# Patient Record
Sex: MALE | Race: WHITE | Employment: UNEMPLOYED | URBAN - METROPOLITAN AREA
[De-identification: names, ages, dates, MRNs, and addresses within clinical notes are randomized per-mention and may not be internally consistent; named-entity substitution may affect disease eponyms.]

---

## 2017-07-31 RX ORDER — CLOPIDOGREL BISULFATE 75 MG/1
75 TABLET ORAL DAILY
Qty: 30 TAB | Refills: 6 | Status: SHIPPED | OUTPATIENT
Start: 2017-07-31 | End: 2018-05-07 | Stop reason: SDUPTHER

## 2017-08-04 ENCOUNTER — TELEPHONE (OUTPATIENT)
Dept: CARDIOLOGY CLINIC | Age: 57
End: 2017-08-04

## 2017-08-04 NOTE — TELEPHONE ENCOUNTER
Patient has been having dizziness and \"swish-swish\" sound In the back of his head since yesterday 3 august. Patient is concerned about it and wants to know if it is appropriate to be seen on the  or he needs to be moved earlier. No visual symptoms. He says he hears \"swish\" sounds when he looks to the different directions. Pt went to urgent care and they told him it might be\"vascular\". Patient stated his BP there was 117/73. Asked Dr Alyson Mary about patient's condition. Dr Karena Do stated it is highly unlikely to be vascular and most likely related to inner ear liquid. Stated follow-up on the  is more than appropriate. Called patient. Verified  and full name. Informed about follow up on   per Dr Don Mckeon. Patient verbalized understanding and agreed with the plan of care. Patient plans to either see his PCP or ENT.

## 2017-08-25 ENCOUNTER — OFFICE VISIT (OUTPATIENT)
Dept: CARDIOLOGY CLINIC | Age: 57
End: 2017-08-25

## 2017-08-25 VITALS — HEIGHT: 73 IN

## 2017-08-25 DIAGNOSIS — I25.10 ATHEROSCLEROSIS OF NATIVE CORONARY ARTERY OF NATIVE HEART WITHOUT ANGINA PECTORIS: Primary | ICD-10-CM

## 2017-08-25 RX ORDER — CYCLOBENZAPRINE HCL 10 MG
10 TABLET ORAL
Qty: 90 TAB | Refills: 2 | Status: SHIPPED | OUTPATIENT
Start: 2017-08-25 | End: 2018-07-31 | Stop reason: ALTCHOICE

## 2017-08-25 RX ORDER — ROSUVASTATIN CALCIUM 20 MG/1
TABLET, FILM COATED ORAL
Refills: 3 | COMMUNITY
Start: 2017-06-02 | End: 2018-07-31 | Stop reason: ALTCHOICE

## 2017-08-25 RX ORDER — ALPRAZOLAM 0.5 MG/1
0.5 TABLET ORAL
Qty: 60 TAB | Refills: 2 | Status: SHIPPED | OUTPATIENT
Start: 2017-08-25 | End: 2018-07-31 | Stop reason: ALTCHOICE

## 2017-08-25 NOTE — PROGRESS NOTES
1. Have you been to the ER, urgent care clinic since your last visit? Hospitalized since your last visit? no  2. Have you seen or consulted any other health care providers outside of the [address] [place] [address] since your last visit? Include any pap smears or colon screening.  no

## 2017-08-29 NOTE — PROGRESS NOTES
PATIENT NAME: Mi Looney.         49 y.o.      1960              DATE:2017    REASON FOR VISIT: Coronary artery disease    HISTORY OF PRESENT ILLNESS: Generally feels miserable. This is a chronic problem. Chronically fatigued. Discomfort across both shoulders on a regular basis. These are constant problems and are not related to exertion. Denies symptoms suggestive of angina. Denies dyspnea on exertion, orthopnea, paroxysmal nocturnal dyspnea. Has been taking Xanax long-term for anxiety. His primary care physician has  recently. He has not seen a primary care since that time. He also takes Flexeril for muscle relaxant. PAST MEDICAL HISTORY:   Past Medical History:  No date: Anxiety  No date: Cardiomyopathy [facility])      Comment: EF 50% (GATED NUC ),  40-45%                (VENTRICULOGRAPHY ),  50% (ECHO 2/15),  39%               (GATED NUC 2/15)  No date: Cervical pain  No date: Coronary artery disease      Comment: RCA - 3.5 x 23mm VISION ,  LAD - 3.0 x                18mm XIENCE 3/11  No date: Dyslipidemia  No date: High cholesterol  No date: Noncompliance  No date: Tobacco abuse    PAST SURGICAL HISTORY:   Past Surgical History:  2016: CARDIAC CATHETERIZATION      Comment:    2016: CORONARY ARTERY ANGIOGRAM      Comment:    2016: CORONARY STENT SINGLE W/PTCA      Comment:    2016: LV ANGIOGRAPHY      Comment:    No date: STENT INSERTION      SOCIAL HISTORY:  Social History    Marital status:              Spouse name:                       Years of education:                 Number of children:               Social History Main Topics    Smoking status: Former Smoker                                                                Packs/day: 0.25      Years: 15.00          Types: Cigarettes       Quit date: 2016    Smokeless status: Never Used                        Alcohol use: No              Drug use:  Yes                Special: Marijuana       Comment: rarely      ALLERGIES:    -- Beta Blocker [Beta-Blockers (Beta-Adrenergic Blocking Agts)] -- Other (comments)    --  symptomatic bradycardia   -- Pcn [Penicillins] -- Swelling     CURRENT MEDICATIONS:   Current Outpatient Prescriptions:  CRESTOR 20 mg tablet, TAKE 1 TABLET BY MOUTH AT BEDTIME  ALPRAZolam (XANAX) 0.5 mg tablet, Take 1 Tab by mouth three (3) times daily as needed for Anxiety. Max Daily Amount: 1.5 mg.  cyclobenzaprine (FLEXERIL) 10 mg tablet, Take 1 Tab by mouth three (3) times daily as needed for Muscle Spasm(s). clopidogrel (PLAVIX) 75 mg tab, Take 1 Tab by mouth daily. nitroglycerin (NITROSTAT) 0.4 mg SL tablet, 1 Tab by SubLINGual route every five (5) minutes as needed for Chest Pain. No current facility-administered medications for this visit. REVIEW of SYSTEMS:History obtained from chart review and the patient  General ROS: negative for - weight gain or weight loss  Hematological and Lymphatic ROS: negative for - bleeding problems  Respiratory ROS: no cough, shortness of breath, or wheezing  Cardiovascular ROS: See history of present illness  Musculoskeletal ROS: See history of present illness     PHYSICAL EXAMINATION:   BP (P) 122/88  Pulse (P) 73  Ht 6' 1\" (1.854 m)  Wt (P) 89.4 kg (197 lb)  SpO2 (P) 98%  BMI (P) 25.99 kg/m2  BP Readings from Last 3 Encounters:  08/25/17 : (P) 122/88  07/18/16 : 118/80  06/24/16 : 126/86    Pulse Readings from Last 3 Encounters:  08/25/17 : (P) 73  07/18/16 : 65  06/24/16 : (!) 44    Wt Readings from Last 3 Encounters:  08/25/17 : (P) 89.4 kg (197 lb)  07/18/16 : 90.7 kg (200 lb)  06/23/16 : 88.4 kg (194 lb 14.4 oz)    General: Well-developed white male in no apparent distress. HEENT: Sclera clear. Mucous membranes pink and moist.  Neck: No jugular venous distention. Carotid upstrokes 2+ without bruits. Chest: Clear to  auscultation. Heart: PMI not palpable. Regular rhythm. No murmur or gallop.   Abdomen: Nontender without masses or organomegaly. Extremities: No edema. Skin: Warm and dry. No stasis changes. Neuro: Alert, oriented, speech WNL, no facial asymmetry. Gait WNL. EKG: Inferior myocardial infarction age-indeterminate    IMPRESSION:   Coronary artery disease, no angina status post PTCA  Anxiety  Chronic muscle pains shoulders and back  Hyperlipidemia followed by PCP    PLAN:  No change in cardiac medications. Xanax 0.5 mg p.o. twice daily as needed anxiety. 60 tablets 2 refills  Flexeril 10 mg p.o. 3 times daily as needed 90 tablets 2 refills  Follow-up with primary care physician for further refills    The diagnoses and plan were discussed with patient. All questions answered. Plan of care agreed to by all concerned. Edmonia Apgar.  MD Michel       ,

## 2018-02-23 ENCOUNTER — HOSPITAL ENCOUNTER (OUTPATIENT)
Dept: GENERAL RADIOLOGY | Age: 58
Discharge: HOME | End: 2018-02-23
Payer: COMMERCIAL

## 2018-02-23 DIAGNOSIS — M25.571 RIGHT ANKLE PAIN: ICD-10-CM

## 2018-02-23 DIAGNOSIS — M79.671 RIGHT FOOT PAIN: ICD-10-CM

## 2018-02-23 PROCEDURE — 73630 X-RAY EXAM OF FOOT: CPT

## 2018-02-23 PROCEDURE — 73600 X-RAY EXAM OF ANKLE: CPT

## 2018-03-02 ENCOUNTER — OFFICE VISIT (OUTPATIENT)
Dept: VASCULAR SURGERY | Age: 58
End: 2018-03-02

## 2018-03-02 DIAGNOSIS — M79.604 PAIN IN RIGHT LEG: Primary | ICD-10-CM

## 2018-03-02 NOTE — MR AVS SNAPSHOT
[address] 
[phone] Patient: Johanna Nelson MRN: [id number] Visit Information Date & Time Provider Department Dept. Phone Encounter #  
 3/2/2018 11:00 AM [facility] 1 [facility] [phone] [id number] Upcoming Health Maintenance Date Due Hepatitis C Screening 1960 Pneumococcal 19-64 Medium Risk (1 of 1 - PPSV23) 12/15/1981 DTaP/Tdap/Td series (1 - Tdap) 12/15/1983 FOBT Q 1 YEAR AGE 50-75 12/15/2012 Influenza Age 5 to Adult 8/1/2017 Allergies as of 3/2/2018  Review Complete On: 8/25/2017 By: Ina Osman Severity Noted Reaction Type Reactions Beta Blocker [Beta-blockers (Beta-adrenergic Blocking Agts)]    Other (comments)  
 symptomatic bradycardia Pcn [Penicillins]  03/04/2015   Systemic Swelling Current Immunizations  Never Reviewed No immunizations on file. Not reviewed this visit Vitals Smoking Status Former Smoker Preferred Pharmacy Pharmacy Name Phone [address], [address] [place] [address] [phone] Your Updated Medication List  
  
   
This list is accurate as of 3/2/18 11:30 AM.  Always use your most recent med list.  
  
  
  
  
 ALPRAZolam 0.5 mg tablet Commonly known as:  Jenny Brittomon Take 1 Tab by mouth three (3) times daily as needed for Anxiety. Max Daily Amount: 1.5 mg.  
  
 clopidogrel 75 mg Tab Commonly known as:  PLAVIX Take 1 Tab by mouth daily. CRESTOR 20 mg tablet Generic drug:  rosuvastatin TAKE 1 TABLET BY MOUTH AT BEDTIME  
  
 cyclobenzaprine 10 mg tablet Commonly known as:  FLEXERIL Take 1 Tab by mouth three (3) times daily as needed for Muscle Spasm(s). nitroglycerin 0.4 mg SL tablet Commonly known as:  NITROSTAT 1 Tab by SubLINGual route every five (5) minutes as needed for Chest Pain. Introducing [facility]! [employer] introduces BOOK A TIGER patient portal. Now you can access parts of your medical record, email your doctor's office, and request medication refills online. 1. In your internet browser, go to [email or web address] 2. Click on the First Time User? Click Here link in the Sign In box. You will see the New Member Sign Up page. 3. Enter your BOOK A TIGER Access Code exactly as it appears below. You will not need to use this code after youve completed the sign-up process. If you do not sign up before the expiration date, you must request a new code. · BOOK A TIGER Access Code: [email or web address] Expires: 5/28/2018  9:19 AM 
 
4. Enter the last four digits of your Social Security Number (xxxx) and Date of Birth (mm/dd/yyyy) as indicated and click Submit. You will be taken to the next sign-up page. 5. Create a BOOK A TIGER ID. This will be your BOOK A TIGER login ID and cannot be changed, so think of one that is secure and easy to remember. 6. Create a BOOK A TIGER password. You can change your password at any time. 7. Enter your Password Reset Question and Answer. This can be used at a later time if you forget your password. 8. Enter your e-mail address. You will receive e-mail notification when new information is available in [address] 9. Click Sign Up. You can now view and download portions of your medical record. 10. Click the Download Summary menu link to download a portable copy of your medical information. If you have questions, please visit the Frequently Asked Questions section of the BOOK A TIGER website. Remember, BOOK A TIGER is NOT to be used for urgent needs. For medical emergencies, dial 911. Now available from your iPhone and Android! Please provide this summary of care documentation to your next provider. Your primary care clinician is listed as Lana Villanueva. If you have any questions after today's visit, please call [phone].

## 2018-03-02 NOTE — PROCEDURES
[facility]  *** FINAL REPORT ***    Name: Danni Machado  MRN: [id number]       Outpatient  : 15 Dec 1960  HIS Order #: [id number]  [id number] [facility] Visit #: [id number]  Date: 02 Mar 2018    TYPE OF TEST: Peripheral Venous Testing    REASON FOR TEST  Limb Pain    Right Leg:-  Deep venous thrombosis:           No  Superficial venous thrombosis:    Not examined  Deep venous insufficiency:        Not examined  Superficial venous insufficiency: Not examined      INTERPRETATION/FINDINGS  Duplex images were obtained using 2-D gray scale, color flow and  spectral doppler analysis. Right leg :  1. No evidence of deep vein thrombosis in the right common femoral,  proximal deep femoral, femoral, popliteal, posterior tibial and  peroneal veins. 2. Triphasic right posterior tibial artery flow. 3. Patent contralateral common femoral vein with normal venous  hemodynamics. ADDITIONAL COMMENTS    I have personally reviewed the data relevant to the interpretation of  this  study. TECHNOLOGIST: Vanessa Altamirano RVT, ANDREIMS  Signed: 2018 12:30 PM    PHYSICIAN: Aidan Shankar D.O.   Signed: 2018 05:12 PM

## 2018-05-07 RX ORDER — CLOPIDOGREL BISULFATE 75 MG/1
75 TABLET ORAL DAILY
Qty: 30 TAB | Refills: 6 | Status: SHIPPED | OUTPATIENT
Start: 2018-05-07 | End: 2018-07-31 | Stop reason: ALTCHOICE

## 2018-07-31 ENCOUNTER — OFFICE VISIT (OUTPATIENT)
Dept: CARDIOLOGY CLINIC | Age: 58
End: 2018-07-31

## 2018-07-31 VITALS — WEIGHT: 215 LBS | HEIGHT: 73 IN | BODY MASS INDEX: 28.49 KG/M2 | OXYGEN SATURATION: 97 % | HEART RATE: 69 BPM

## 2018-07-31 DIAGNOSIS — E78.5 DYSLIPIDEMIA: ICD-10-CM

## 2018-07-31 DIAGNOSIS — I20.0 UNSTABLE ANGINA (HCC): ICD-10-CM

## 2018-07-31 DIAGNOSIS — R06.02 SOB (SHORTNESS OF BREATH): ICD-10-CM

## 2018-07-31 DIAGNOSIS — I25.10 ATHEROSCLEROSIS OF NATIVE CORONARY ARTERY OF NATIVE HEART WITHOUT ANGINA PECTORIS: Primary | ICD-10-CM

## 2018-07-31 DIAGNOSIS — R60.9 SWELLING: ICD-10-CM

## 2018-07-31 RX ORDER — ASPIRIN 81 MG/1
81 TABLET ORAL DAILY
Qty: 30 TAB | Refills: 6 | Status: SHIPPED | OUTPATIENT
Start: 2018-07-31 | End: 2018-12-28 | Stop reason: ALTCHOICE

## 2018-07-31 RX ORDER — ASPIRIN 81 MG/1
81 TABLET ORAL DAILY
COMMUNITY
End: 2018-12-28 | Stop reason: ALTCHOICE

## 2018-07-31 RX ORDER — ROSUVASTATIN CALCIUM 40 MG/1
40 TABLET, COATED ORAL
Qty: 30 TAB | Refills: 6 | Status: SHIPPED | OUTPATIENT
Start: 2018-07-31 | End: 2018-12-28 | Stop reason: ALTCHOICE

## 2018-07-31 NOTE — PROGRESS NOTES
HISTORY OF PRESENT ILLNESS Lien Menedz is a 56 y.o. male. ASSESSMENT and PLAN Mr. Valorie Skinner has history of CAD. Back in 2011, he had Vision stent to the RCA and Xience stent to the LAD at [facility]. He presented with unstable angina to [facility] in June 2016. He had new distal 90% RCA stenosis proximal to the bifurcation. This was successfully stented using science BRANDI. He could not tolerate beta blockers in the past and previously had refused statins. However, because of significant dyslipidemia, he is now on Crestor 20 mg daily. His cholesterol LDL still has not met target. Therefore, this will be increased to 40 mg daily. He was told by his sister who is a nurse that Plavix can cause swelling and should only be continued for 1 year after BRANDI. Therefore, he discontinued this on his own. He feels that his swelling has improved. All questions were answered. Over 30 minutes spent. He will continue on Crestor 40 mg daily and baby aspirin daily. As noted above, he could not tolerate beta blockers in the past due to significant fatigue and bradycardia. His blood pressure is upper normal to mildly elevated. Again, lengthy discussion was carried on about the importance of weight control. Today he weighs 215 pounds. His good target weight will be 200-205 pounds. He has successfully stayed off of active tobacco use. He uses Vape. I will see him back in 6-9 months. Thank you. Encounter Diagnoses Name Primary?  Atherosclerosis of native coronary artery of native heart without angina pectoris Yes  Unstable angina (HCC)  Dyslipidemia  SOB (shortness of breath)  Swelling   
 
the following changes in treatment are made: See above 
lab results and schedule of future lab studies reviewed with patient 
reviewed diet, exercise and weight control 
very strongly urged to quit smoking to reduce cardiovascular risk 
cardiovascular risk and specific lipid/LDL goals reviewed 
use of aspirin to prevent MI and TIA's discussed HPI Mr. Xuan Barone comes in as a new patient to me. He denies any episodes of chest pains. He denies any exertional chest pains. He has noted episodes of ankle swelling and headaches. He self discontinued Plavix and his ankle swelling has resolved. He has difficulty sleeping at night due to insomnia and often experiences headaches. He denies any orthopnea or PND. He denies any exertional chest pains. Review of Systems Constitutional: Positive for malaise/fatigue. Respiratory: Negative for shortness of breath. Cardiovascular: Positive for leg swelling. Negative for chest pain, palpitations, orthopnea, claudication and PND. Neurological: Positive for headaches. All other systems reviewed and are negative. Physical Exam  
Constitutional: He is oriented to person, place, and time. He appears well-developed and well-nourished. HENT:  
Head: Normocephalic. Eyes: Conjunctivae are normal.  
Neck: Neck supple. No JVD present. Carotid bruit is not present. No thyromegaly present. Cardiovascular: Normal rate and regular rhythm. No murmur heard. Pulmonary/Chest: Breath sounds normal.  
Abdominal: Bowel sounds are normal.  
Musculoskeletal: He exhibits no edema. Neurological: He is alert and oriented to person, place, and time. Skin: Skin is warm and dry. Nursing note and vitals reviewed. PCP: GRAHAM Jaimes Past Medical History:  
Diagnosis Date  Anxiety  Cardiomyopathy ([facility] [address] EF 50% (GATED NUC 7/12),  40-45% (VENTRICULOGRAPHY 5/13),  50% (ECHO 2/15),  39% (GATED NUC 2/15)  Cervical pain  Coronary artery disease RCA - 3.5 x 23mm VISION 2/11,  LAD - 3.0 x 18mm XIENCE 3/11  Dyslipidemia  High cholesterol  Noncompliance  Tobacco abuse Past Surgical History:  
Procedure Laterality Date  CARDIAC CATHETERIZATION  6/23/2016  CORONARY ARTERY ANGIOGRAM  6/23/2016  CORONARY STENT SINGLE W/PTCA  6/23/2016  LV ANGIOGRAPHY  6/23/2016  STENT INSERTION Current Outpatient Prescriptions Medication Sig Dispense Refill  aspirin delayed-release 81 mg tablet Take 81 mg by mouth daily.  rosuvastatin (CRESTOR) 40 mg tablet Take 1 Tab by mouth nightly. 30 Tab 6  
 aspirin delayed-release 81 mg tablet Take 1 Tab by mouth daily. 30 Tab 6  
 nitroglycerin (NITROSTAT) 0.4 mg SL tablet 1 Tab by SubLINGual route every five (5) minutes as needed for Chest Pain. 25 Tab 1 The patient has a family history of 
 
Social History Substance Use Topics  Smoking status: Former Smoker Packs/day: 0.25 Years: 15.00 Types: Cigarettes Quit date: 6/24/2016  Smokeless tobacco: Never Used  Alcohol use No  
 
 
Lab Results Component Value Date/Time Cholesterol, total 204 (H) 06/22/2016 01:10 AM  
 HDL Cholesterol 45 06/22/2016 01:10 AM  
 LDL, calculated 134.2 (H) 06/22/2016 01:10 AM  
 Triglyceride 124 06/22/2016 01:10 AM  
 CHOL/HDL Ratio 4.5 06/22/2016 01:10 AM  
  
 
BP Readings from Last 3 Encounters:  
08/25/17 (P) 122/88  
07/18/16 118/80  
06/24/16 126/86 Pulse Readings from Last 3 Encounters:  
07/31/18 69  
08/25/17 (P) 73  
07/18/16 65 Wt Readings from Last 3 Encounters:  
07/31/18 97.5 kg (215 lb)  
08/25/17 (P) 89.4 kg (197 lb)  
07/18/16 90.7 kg (200 lb) EKG: unchanged from previous tracings, normal sinus rhythm, nonspecific ST and T waves changes, LAFB, poor R-wave progression in the precordial leads.

## 2018-07-31 NOTE — PATIENT INSTRUCTIONS
ECHO for sob swelling Start Crestor 40mg once daily Aspirin 81mg once daily Lipid panel (blood Work) check 6 weeks Follow up 9 months

## 2018-08-07 ENCOUNTER — HOSPITAL ENCOUNTER (OUTPATIENT)
Dept: NON INVASIVE DIAGNOSTICS | Age: 58
Discharge: HOME | End: 2018-08-07
Attending: INTERNAL MEDICINE
Payer: COMMERCIAL

## 2018-08-07 VITALS
SYSTOLIC BLOOD PRESSURE: 122 MMHG | HEIGHT: 73 IN | DIASTOLIC BLOOD PRESSURE: 88 MMHG | WEIGHT: 215 LBS | BODY MASS INDEX: 28.49 KG/M2

## 2018-08-07 DIAGNOSIS — R06.02 SOB (SHORTNESS OF BREATH): ICD-10-CM

## 2018-08-07 DIAGNOSIS — R60.9 SWELLING: ICD-10-CM

## 2018-08-07 LAB
ECHO AO ROOT DIAM: 3.76 CM
ECHO LA AREA 4C: 21.9 CM2
ECHO LA VOL 2C: 88.63 ML (ref 18–58)
ECHO LA VOL 4C: 70.26 ML (ref 18–58)
ECHO LA VOL BP: 87.74 ML (ref 18–58)
ECHO LA VOL/BSA BIPLANE: 39.54 ML/M2
ECHO LA VOLUME INDEX A2C: 39.94 ML/M2
ECHO LA VOLUME INDEX A4C: 31.66 ML/M2
ECHO LV E' LATERAL VELOCITY: 8.92 CM/S
ECHO LV E' SEPTAL VELOCITY: 7.25 CM/S
ECHO LV INTERNAL DIMENSION DIASTOLIC: 6.64 CM (ref 4.2–5.9)
ECHO LV INTERNAL DIMENSION SYSTOLIC: 4.6 CM
ECHO LV IVSD: 1.19 CM (ref 0.6–1)
ECHO LV MASS 2D: 456.9 G (ref 88–224)
ECHO LV MASS INDEX 2D: 205.9 G/M2
ECHO LV POSTERIOR WALL DIASTOLIC: 1.23 CM (ref 0.6–1)
ECHO LVOT DIAM: 2.3 CM
ECHO LVOT PEAK GRADIENT: 3.4 MMHG
ECHO LVOT PEAK VELOCITY: 91.64 CM/S
ECHO LVOT VTI: 18.71 CM
ECHO MV A VELOCITY: 71.03 CM/S
ECHO MV AREA PISA: 0.1 CM2
ECHO MV E DECELERATION TIME (DT): 176.4 MS
ECHO MV E VELOCITY: 0.74 CM/S
ECHO MV E/A RATIO: 0.01
ECHO MV E/E' LATERAL: 0.08
ECHO MV E/E' RATIO (AVERAGED): 0.09
ECHO MV E/E' SEPTAL: 0.1
ECHO MV EROA PISA: 0.1 CM2
ECHO MV REGURGITANT PEAK GRADIENT: 172.1 MMHG
ECHO MV REGURGITANT PEAK VELOCITY: 655.85 CM/S
ECHO MV REGURGITANT RADIUS PISA: 0.42 CM
ECHO MV REGURGITANT VOLUME: 11.97 CC
ECHO MV REGURGITANT VTIA: 229.57 CM
ECHO RV TAPSE: 2.45 CM (ref 1.5–2)
ECHO TV REGURGITANT MAX VELOCITY: 291.97 CM/S
ECHO TV REGURGITANT PEAK GRADIENT: 34.1 MMHG

## 2018-08-07 PROCEDURE — 93306 TTE W/DOPPLER COMPLETE: CPT

## 2018-12-07 ENCOUNTER — TELEPHONE (OUTPATIENT)
Dept: CARDIOLOGY CLINIC | Age: 58
End: 2018-12-07

## 2018-12-07 NOTE — TELEPHONE ENCOUNTER
----- Message from [id number] Constantine Sutton MD sent at 9/11/2018  4:19 PM EDT ----- His EF has declined from 50% to 40% from 2016. There is new mention of mild to moderate aortic insufficiency. Ask him to come and see Cinthia Garcia. If shortness of breath is worsening, will likely need consideration for left and right heart catheterization. He is a new patient to me and I cannot give definitive recommendations just on repeat echo read by Dr. Brian Cortes. 
----- Message ----- From: Bro Lu RN Sent: 8/7/2018  12:28 PM 
     To: [id number] Constantine Sutton MD 
 
Ordered due to SOB

## 2018-12-19 ENCOUNTER — TELEPHONE (OUTPATIENT)
Dept: CARDIOLOGY CLINIC | Age: 58
End: 2018-12-19

## 2018-12-19 NOTE — TELEPHONE ENCOUNTER
----- Message from [id number] Constantine Sutton MD sent at 9/11/2018  4:19 PM EDT -----  His EF has declined from 50% to 40% from 2016. There is new mention of mild to moderate aortic insufficiency. Ask him to come and see Jenelle Ramires. If shortness of breath is worsening, will likely need consideration for left and right heart catheterization.   He is a new patient to me and I cannot give definitive recommendations just on repeat echo read by Dr. Lauren Renteria.  ----- Message -----     From: Saud Brady RN     Sent: 8/7/2018  12:28 PM       To: [id number] Constantine Sutton MD    Ordered due to SOB

## 2018-12-26 NOTE — PROGRESS NOTES
Cris Barajas. presents today for follow-up. He was last seen by Dr Rin Sosa on 7/31/18. He reports elevated blood pressures and a persistent occipital headache that has been occurring for about 10 days. He has been taking about 2400mg of ibuprofen and he receives minimal relief. He states that he saw the NP at his PCP's office and she recommended that he have an eye exam done which he did. He was supposed to follow-up with her after the eye exam but he has not scheduled an appointment. While reviewing his medications, he states that his ASA was discontinued some time ago as he develops lower extremity edema when he takes aspirin. He is a 66year old male with history of CAD (s/p stent to the RCA in Feb. 2011 and stent to the LAD in March 2011). He also has history of dyslipidemia and some non-compliance in the past with taking his medications mainly due to financial constraints. He has not wanted to take a beta blocker because he states that he had an episode of \"low heart rate\" in the past.  He presented to the hospital on 6/21/16, with complaints of exertional chest pain that began 4 to 5 days prior to presenting to the hospital.  He states that symptoms began while he was mowing grass and had to stop to rest several times so the pain would go away. Initial EKG showed no acute changes and initial troponin was within normal limits. He underwent cardiac catheterization on 6/23/16 which showed a new distal 90% immediately proximal to the bifurcation for which he underwent stenting with a 2.75 mm Xience BRANDI (12 mm length) stent. He was started on Plavix. He had an echo done in August 2018, and it showed and EF of 40%, mild concentric LVH, grade 2 diastolic dysfunction, and PASP of 37 mmHg. He denies chest pain, tightness, heaviness, and palpitations. He denies shortness of breath at rest, dyspnea on exertion, orthopnea and PND. He denies abdominal bloating.   He denies lightheadedness, dizziness, and syncope. He admits to lower extremity edema and denies claudication. Denies nausea, vomiting, diarrhea, fever, chills. He admits to being \"hyperactive\" and is not able to sit for prolonged periods of time. He states that his Xanax was discontinued some time ago and he has been \"bouncing around since that time. \"            PMH:  Past Medical History:   Diagnosis Date    Anxiety     Cardiomyopathy ([address]     EF 50% (GATED NUC 7/12),  40-45% (VENTRICULOGRAPHY 5/13),  50% (ECHO 2/15),  39% (GATED NUC 2/15)    Cervical pain     Coronary artery disease     RCA - 3.5 x 23mm VISION 2/11,  LAD - 3.0 x 18mm XIENCE 3/11    Dyslipidemia     High cholesterol     Noncompliance     Tobacco abuse        PSH:  Past Surgical History:   Procedure Laterality Date    CARDIAC CATHETERIZATION  6/23/2016         CORONARY ARTERY ANGIOGRAM  6/23/2016         CORONARY STENT SINGLE W/PTCA  6/23/2016         LV ANGIOGRAPHY  6/23/2016         STENT INSERTION         MEDS:  Current Outpatient Medications   Medication Sig    rosuvastatin (CRESTOR) 10 mg tablet Take 10 mg by mouth nightly.  nitroglycerin (NITROSTAT) 0.4 mg SL tablet 1 Tab by SubLINGual route every five (5) minutes as needed for Chest Pain. No current facility-administered medications for this visit. Allergies and Sensitivities:  Allergies   Allergen Reactions    Beta Blocker [Beta-Blockers (Beta-Adrenergic Blocking Agts)] Other (comments)     symptomatic bradycardia    Pcn [Penicillins] Swelling       Family History:  Family History   Problem Relation Age of Onset    Hypertension Mother     Diabetes Mother     Colon Cancer Mother 79    Heart Disease Father        Social History:  He  reports that he quit smoking about 2 years ago. His smoking use included cigarettes. He has a 3.75 pack-year smoking history. he has never used smokeless tobacco.  He  reports that he does not drink alcohol.       Physical:  Visit Vitals  BP (!) 140/96   Pulse 70   Ht 6' 1\" (1.854 m)   Wt 98 kg (216 lb)   SpO2 98%   BMI 28.50 kg/m²     His weight is up 1 pound since his last appointment. Exam:  Neck:  Supple, no JVD, no carotid bruits  CV:  Normal S1 and  S2, no murmurs, rubs, or gallops noted  Lungs:  Clear to ausculation throughout, no wheezes or rales  Abd:  Soft, non-tender, non-distended with good bowel sounds. No hepatosplenomegaly  Extremities:  1+ softly pitting lower extremity edema around his ankles. Data:  EKG:    Read by Glen Pool MD - Sinus rhythm with PVCs.  IVCD.  Diffuse ST segment and T-wave abnormality      LABS:  Lab Results   Component Value Date/Time    Sodium 139 06/23/2016 12:53 PM    Potassium 4.1 06/23/2016 12:53 PM    Chloride 107 06/23/2016 12:53 PM    CO2 28 06/23/2016 12:53 PM    Glucose 159 (H) 06/23/2016 12:53 PM    BUN 25 (H) 06/23/2016 12:53 PM    Creatinine 1.09 06/23/2016 12:53 PM     Lab Results   Component Value Date/Time    Cholesterol, total 204 (H) 06/22/2016 01:10 AM    HDL Cholesterol 45 06/22/2016 01:10 AM    LDL, calculated 134.2 (H) 06/22/2016 01:10 AM    Triglyceride 124 06/22/2016 01:10 AM    CHOL/HDL Ratio 4.5 06/22/2016 01:10 AM     Lab Results   Component Value Date/Time    ALT (SGPT) 28 06/21/2016 06:41 PM         Impression / Plan:  1.  CAD, s/p recent PCI/stent to RCA (6/23/16) and history of PCI/stents to RCA in Feb. 2011 and the LAD in March 2011  2. Dyslipidemia, currently not on a statin due to cost    Mr. Jackelin Lancaster was seen today for follow-up. He reports elevated blood pressures at home and also a persistent occipital headache that has been occurring for about 12 days. He has been taking about 2400mg of ibuprofen in a day with minimal relief. He states that primary care is aware of the headache and an eye exam was recommended which he had performed. He was supposed to follow-up with the NP at his PCP's office after the eye exam but he did not schedule an appointment.   He will call for follow-up on 12/31/18. If headache worsens or does not improve prior to follow-up with PCP, he was instructed to go to the ER for further evaluation and treatment. His blood pressure today is elevated at 140/96 today. He admits to dietary indiscretion with sodium as he does not limit his sodium intake and eats out almost every day. He also admits to drinking at least 5, 16 oz bottles of water and 24 ounces of Pepsi daily. He also continues to smoke about 10 cigarettes per day (1/2 pack). We had a long discussion regarding the need for him to adhere to a low sodium diet (2000mg per day) and to decrease his fluid intake. He is resistant as he states that he does not know what to eat and everything he likes is normally high in sodium. Patient education material re:  HTN, low sodium diet, and DASH diet attached to his after visit summary. Informed that his dietary intake of high sodium foods and fluids is likely contributing to his elevated blood pressures and edema. Greater than 50% of a 40 minute visit was spent in discussion, counseling, and answering questions. Smoking cessation was discussed and highly encouraged. He had an echo done in Aug. 2018 and his EF was noted to be down to 40%. Medical management of cardiomyopathy was discussed. Will start him on carvedilol 3.125mg BID and will attempt to titrate his dose every 3-4 weeks. At some point, will also try to add an ACEI and aldactone (if his blood pressure allows). He is hesitant to return any sooner as he states that he is currently not working and his co-pay is $50 per visit. He will follow-up with Dr. Omer Villanueva as scheduled and as needed. Tiny Valdivia MSN, FNP-BC    Please note:  Portions of this chart were created with Dragon medical speech to text program.  Unrecognized errors may be present.

## 2018-12-28 ENCOUNTER — OFFICE VISIT (OUTPATIENT)
Dept: CARDIOLOGY CLINIC | Age: 58
End: 2018-12-28

## 2018-12-28 VITALS
OXYGEN SATURATION: 98 % | DIASTOLIC BLOOD PRESSURE: 96 MMHG | HEART RATE: 70 BPM | HEIGHT: 73 IN | WEIGHT: 216 LBS | SYSTOLIC BLOOD PRESSURE: 140 MMHG | BODY MASS INDEX: 28.63 KG/M2

## 2018-12-28 DIAGNOSIS — I25.10 ATHEROSCLEROSIS OF NATIVE CORONARY ARTERY OF NATIVE HEART WITHOUT ANGINA PECTORIS: Primary | ICD-10-CM

## 2018-12-28 DIAGNOSIS — I42.9 CARDIOMYOPATHY, UNSPECIFIED TYPE (HCC): ICD-10-CM

## 2018-12-28 DIAGNOSIS — E78.5 DYSLIPIDEMIA: ICD-10-CM

## 2018-12-28 RX ORDER — ROSUVASTATIN CALCIUM 10 MG/1
10 TABLET, COATED ORAL
COMMUNITY
End: 2019-08-12 | Stop reason: SDUPTHER

## 2018-12-28 NOTE — PROGRESS NOTES
Makayla Bryant. presents today for Chief Complaint Patient presents with  Follow-up 6 month Makayla Bryant. preferred language for health care discussion is english/other. Is someone accompanying this pt? Yes Is the patient using any DME equipment during OV? No 
 
Depression Screening: No flowsheet data found. Learning Assessment: 
Learning Assessment 2/12/2015 PRIMARY LEARNER Patient PRIMARY LANGUAGE ENGLISH  
LEARNER PREFERENCE PRIMARY DEMONSTRATION  
ANSWERED BY patient RELATIONSHIP SELF Abuse Screening: No flowsheet data found. Fall Risk No flowsheet data found. Pt currently taking Antiplatelet therapy? No 
 
Coordination of Care: 1. Have you been to the ER, urgent care clinic since your last visit? Hospitalized since your last visit? No 
2. Have you seen or consulted any other health care providers outside of the [address] [place] [address] since your last visit? Include any pap smears or colon screening.  No

## 2019-01-16 NOTE — PROGRESS NOTES
Margie Duvall presents today for follow-up after being started on carvedilol 3.125mg BID. During his last visit, he reported elevated blood pressures and a persistent occipital headache that had been occurring for about 10 days. He reports that he began taking the carvedilol and did not feel it was working after one week so he increased the dose on his own to 6.25mg BID. He noticed improvement in his blood pressure and heart rate. He reports that his heart rate was elevated in the 120's at the initiation of the therapy. With the increased dose, his heart rate went into the 90's. He also reports that he has taken some aspirin since his last visit and he has not had any problems with edema. He also has been trying to limit his sodium and not drink as much fluids. **Note:  He is listed to have an allergy to beta blockers (symptomatic bradycardia) but he states that the carvedilol has not affected him as other beta blockers used in the past.** While reviewing his medications, he states that his ASA was discontinued some time ago as he develops lower extremity edema when he takes aspirin. He is a 66year old male with history of CAD (s/p stent to the RCA in Feb. 2011 and stent to the LAD in March 2011). He also has history of dyslipidemia and some non-compliance in the past with taking his medications mainly due to financial constraints. He has not wanted to take a beta blocker because he states that he had an episode of \"low heart rate\" in the past.  He presented to the hospital on 6/21/16, with complaints of exertional chest pain that began 4 to 5 days prior to presenting to the hospital.  He states that symptoms began while he was mowing grass and had to stop to rest several times so the pain would go away. Initial EKG showed no acute changes and initial troponin was within normal limits.   He underwent cardiac catheterization on 6/23/16 which showed a new distal 90% immediately proximal to the bifurcation for which he underwent stenting with a 2.75 mm Xience BRANDI (12 mm length) stent. He was started on Plavix. He had an echo done in August 2018, and it showed and EF of 40%, mild concentric LVH, grade 2 diastolic dysfunction, and PASP of 37 mmHg. He was last seen by Dr Dalia Bautista on 7/31/18. Today, he reports a \"soreness\" in the back of his neck somewhat reminiscent of what he experienced prior to stenting in 2011. He denies chest pain, tightness, heaviness, and admits to palpitations and chest \"discomfort. \"  He denies shortness of breath at rest, dyspnea on exertion, orthopnea and PND. He denies abdominal bloating. He denies lightheadedness, dizziness, and syncope. He admits to lower extremity edema and denies claudication. Denies nausea, vomiting, diarrhea, fever, chills. He admits to being \"hyperactive\" and is not able to sit for prolonged periods of time. He states that his Xanax was discontinued some time ago and he has been \"bouncing around since that time. \"   
 
 
 
 
PMH: 
Past Medical History:  
Diagnosis Date  Anxiety  Cardiomyopathy ([facility] [address] EF 50% (GATED NUC 7/12),  40-45% (VENTRICULOGRAPHY 5/13),  50% (ECHO 2/15),  39% (GATED NUC 2/15)  Cervical pain  Coronary artery disease RCA - 3.5 x 23mm VISION 2/11,  LAD - 3.0 x 18mm XIENCE 3/11  Dyslipidemia  High cholesterol  Noncompliance  Tobacco abuse PSH: 
Past Surgical History:  
Procedure Laterality Date  CARDIAC CATHETERIZATION  6/23/2016  CORONARY ARTERY ANGIOGRAM  6/23/2016  CORONARY STENT SINGLE W/PTCA  6/23/2016  LV ANGIOGRAPHY  6/23/2016  STENT INSERTION    
 
 
MEDS: 
Current Outpatient Medications Medication Sig  carvedilol (COREG) 3.125 mg tablet Take 2 Tabs by mouth two (2) times daily (with meals).  rosuvastatin (CRESTOR) 10 mg tablet Take 10 mg by mouth nightly.  nitroglycerin (NITROSTAT) 0.4 mg SL tablet 1 Tab by SubLINGual route every five (5) minutes as needed for Chest Pain. No current facility-administered medications for this visit. Allergies and Sensitivities: 
Allergies Allergen Reactions  Beta Blocker [Beta-Blockers (Beta-Adrenergic Blocking Agts)] Other (comments)  
  symptomatic bradycardia  Pcn [Penicillins] Swelling Family History: 
Family History Problem Relation Age of Onset  Hypertension Mother  Diabetes Mother  Colon Cancer Mother 79  
 Heart Disease Father Social History: He  reports that he quit smoking about 2 years ago. His smoking use included cigarettes. He has a 3.75 pack-year smoking history. he has never used smokeless tobacco.  He  reports that he does not drink alcohol. Physical: 
Visit Vitals BP (!) 150/96 Pulse 60 Ht 6' 1\" (1.854 m) Wt 96.2 kg (212 lb) SpO2 98% BMI 27.97 kg/m² His weight is down 4 pounds since his last appointment. Exam: 
Neck:  Supple, no JVD, no carotid bruits CV:  Normal S1 and  S2, no murmurs, rubs, or gallops noted Lungs:  Clear to ausculation throughout, no wheezes or rales Abd:  Soft, non-tender, non-distended with good bowel sounds. No hepatosplenomegaly Extremities:  No lower extremity edema Data: 
EKG:    Sinus rhythm, occasional ectopic ventricular beat.  Inferior infarct, age undetermined LABS: 
Lab Results Component Value Date/Time Sodium 139 06/23/2016 12:53 PM  
 Potassium 4.1 06/23/2016 12:53 PM  
 Chloride 107 06/23/2016 12:53 PM  
 CO2 28 06/23/2016 12:53 PM  
 Glucose 159 (H) 06/23/2016 12:53 PM  
 BUN 25 (H) 06/23/2016 12:53 PM  
 Creatinine 1.09 06/23/2016 12:53 PM  
 
Lab Results Component Value Date/Time  Cholesterol, total 204 (H) 06/22/2016 01:10 AM  
 HDL Cholesterol 45 06/22/2016 01:10 AM  
 LDL, calculated 134.2 (H) 06/22/2016 01:10 AM  
 Triglyceride 124 06/22/2016 01:10 AM  
 CHOL/HDL Ratio 4.5 06/22/2016 01:10 AM  
 
Lab Results Component Value Date/Time ALT (SGPT) 28 06/21/2016 06:41 PM  
 
 
 
Impression / Plan: 1.  CAD, s/p recent PCI/stent to RCA (6/23/16) and history of PCI/stents to RCA in Feb. 2011 and the LAD in March 2011 2. Dyslipidemia, currently not on a statin due to cost 
3. Chest pain 4. Palpitations Mr. Christina Arriola was seen today for follow-up after being started on carvedilol 3.125mg BID to assist with blood pressure control. His blood pressure during his last visit was 140/96 and he reported high blood pressures at home. He complained of a headache that had been occurring for several days. He was supposed to follow-up with the NP at his PCP's office but did not do so after an eye exam so I recommended that he schedule an appointment as soon as possible. He was advised that it the headache worsened or he was unable to go to the PCP's office, he needs to go to the ER for further evaluation. He reports that he is tolerating the carvedilol and admits that after taking it one week and not noticing a marked improvement in his blood pressure, he began taking 2 of the 3.125mg tablets twice a day. He also reports that his heart rate at times was in the 120's when the carvedilol was initiated. After he began taking the increased dose of carvedilol, his blood pressure and heart rate improved. His blood pressure remains elevated but improved. An EKG done today shows sinus rhythm with a PVC with heart rate in the 70's. He was instructed to continue carvedilol 6.25mg BID (new script given) and will be started on lisinopril 10mg once a day. I did ask that he not make dose adjustments on his own. He told me that he hates \"needles\" and was made aware that a BMP will be requested during his next visit.    
As for his complaints of occasional chest discomfort and \"soreness\" in the back of his neck (reminiscent of symptoms he experienced prior to his stents in 2011), will ask that he have a pharmacologic nuclear stress test to assess for progression of disease. Smoking cessation was discussed and highly encouraged. He is smoking less and recently, 1 pack will last for 3 days instead of 1 pack per day. He had an echo done in Aug. 2018 and his EF was noted to be down to 40%. Medical management of cardiomyopathy was discussed. At some point, will also try to add an ACEI and aldactone (if his blood pressure allows). He is hesitant to return any sooner as he states that he is currently not working and his co-pay is $50 per visit. He will follow-up with Dr. Humphrey Clifton as scheduled and as needed. Miguel A Done MSN, FNP-BC Please note:  Portions of this chart were created with Dragon medical speech to text program.  Unrecognized errors may be present.

## 2019-01-18 ENCOUNTER — OFFICE VISIT (OUTPATIENT)
Dept: CARDIOLOGY CLINIC | Age: 59
End: 2019-01-18

## 2019-01-18 VITALS
WEIGHT: 212 LBS | SYSTOLIC BLOOD PRESSURE: 150 MMHG | HEART RATE: 60 BPM | DIASTOLIC BLOOD PRESSURE: 96 MMHG | BODY MASS INDEX: 28.1 KG/M2 | OXYGEN SATURATION: 98 % | HEIGHT: 73 IN

## 2019-01-18 DIAGNOSIS — E78.5 DYSLIPIDEMIA: ICD-10-CM

## 2019-01-18 DIAGNOSIS — I10 ESSENTIAL HYPERTENSION: Primary | ICD-10-CM

## 2019-01-18 DIAGNOSIS — I42.9 CARDIOMYOPATHY, UNSPECIFIED TYPE (HCC): ICD-10-CM

## 2019-01-18 DIAGNOSIS — I25.10 ATHEROSCLEROSIS OF NATIVE CORONARY ARTERY OF NATIVE HEART WITHOUT ANGINA PECTORIS: ICD-10-CM

## 2019-01-18 DIAGNOSIS — R07.9 CHEST PAIN, UNSPECIFIED TYPE: ICD-10-CM

## 2019-01-18 RX ORDER — CARVEDILOL 3.12 MG/1
6.25 TABLET ORAL 2 TIMES DAILY WITH MEALS
Qty: 1 TAB | Refills: 0
Start: 2019-01-18 | End: 2019-01-18 | Stop reason: DRUGHIGH

## 2019-01-18 RX ORDER — LISINOPRIL 10 MG/1
10 TABLET ORAL DAILY
Qty: 30 TAB | Refills: 6 | Status: SHIPPED | OUTPATIENT
Start: 2019-01-18 | End: 2019-08-12 | Stop reason: SDUPTHER

## 2019-01-18 RX ORDER — CARVEDILOL 6.25 MG/1
6.25 TABLET ORAL 2 TIMES DAILY WITH MEALS
Qty: 60 TAB | Refills: 6 | Status: SHIPPED | OUTPATIENT
Start: 2019-01-18 | End: 2019-08-12 | Stop reason: SDUPTHER

## 2019-01-18 RX ORDER — CARVEDILOL 3.12 MG/1
TABLET ORAL 2 TIMES DAILY WITH MEALS
COMMUNITY
End: 2019-01-18

## 2019-01-18 NOTE — PATIENT INSTRUCTIONS
Continue carvedilol 6.25mg twice a day Begin lisinopril 10mg once a day Follow-up with Chelsie Joaquin in 3 weeks Pharmacologic nuclear stress test; Dx:  CAD, chest pain

## 2019-01-18 NOTE — PROGRESS NOTES
[facility] for Chief Complaint Patient presents with  Follow-up 3 weeks after starting Coreg [facility]. preferred language for health care discussion is english/other. Is someone accompanying this pt? No 
 
Is the patient using any DME equipment during OV? No 
 
Depression Screening: No flowsheet data found. Learning Assessment: 
Learning Assessment 2/12/2015 PRIMARY LEARNER Patient PRIMARY LANGUAGE ENGLISH  
LEARNER PREFERENCE PRIMARY DEMONSTRATION  
ANSWERED BY patient RELATIONSHIP SELF Abuse Screening: No flowsheet data found. Fall Risk No flowsheet data found. Pt currently taking Antiplatelet therapy? No 
 
Coordination of Care: 1. Have you been to the ER, urgent care clinic since your last visit? Hospitalized since your last visit? No 
 
2. Have you seen or consulted any other health care providers outside of the [address] [place] [address] since your last visit? Include any pap smears or colon screening.  No

## 2019-01-29 ENCOUNTER — HOSPITAL ENCOUNTER (OUTPATIENT)
Dept: NON INVASIVE DIAGNOSTICS | Age: 59
Discharge: HOME | End: 2019-01-29
Attending: NURSE PRACTITIONER
Payer: COMMERCIAL

## 2019-01-29 VITALS
WEIGHT: 212 LBS | HEIGHT: 73 IN | SYSTOLIC BLOOD PRESSURE: 132 MMHG | DIASTOLIC BLOOD PRESSURE: 88 MMHG | BODY MASS INDEX: 28.1 KG/M2

## 2019-01-29 DIAGNOSIS — I25.10 ATHEROSCLEROSIS OF NATIVE CORONARY ARTERY OF NATIVE HEART WITHOUT ANGINA PECTORIS: ICD-10-CM

## 2019-01-29 DIAGNOSIS — R07.9 CHEST PAIN, UNSPECIFIED TYPE: ICD-10-CM

## 2019-01-29 LAB
STRESS BASELINE DIAS BP: 88 MMHG
STRESS BASELINE HR: 71 BPM
STRESS BASELINE SYS BP: 132 MMHG
STRESS ESTIMATED WORKLOAD: 1.6 METS
STRESS EXERCISE DUR MIN: NORMAL
STRESS PEAK DIAS BP: 86 MMHG
STRESS PEAK SYS BP: 134 MMHG
STRESS PERCENT HR ACHIEVED: 74 %
STRESS POST PEAK HR: 103 BPM
STRESS RATE PRESSURE PRODUCT: NORMAL BPM*MMHG
STRESS TARGET HR: 139 BPM

## 2019-01-29 PROCEDURE — 74011250636 HC RX REV CODE- 250/636: Performed by: NURSE PRACTITIONER

## 2019-01-29 PROCEDURE — 93017 CV STRESS TEST TRACING ONLY: CPT

## 2019-01-29 RX ORDER — SODIUM CHLORIDE 9 MG/ML
250 INJECTION, SOLUTION INTRAVENOUS CONTINUOUS
Status: DISCONTINUED | OUTPATIENT
Start: 2019-01-29 | End: 2019-02-02 | Stop reason: HOSPADM

## 2019-01-29 RX ADMIN — REGADENOSON 0.4 MG: 0.08 INJECTION, SOLUTION INTRAVENOUS at 10:50

## 2019-01-29 RX ADMIN — SODIUM CHLORIDE 250 ML: 900 INJECTION, SOLUTION INTRAVENOUS at 10:50

## 2019-01-29 NOTE — PROGRESS NOTES
Patient was given 10.24 mCi of Sestamibi for the Resting pictures. Patient received 0.4 mg of Lexiscan for the exercise portion of the Stress test. Patient was then given 33 mCi of Sestamibi for the Stress pictures. Armband was removed and disposed of before the patient left.

## 2019-02-09 NOTE — PROGRESS NOTES
Lenin Bowling presents today for follow-up of his blood pressure after being instructed to continue Carvedilol 6.25mg BID and being started on lisinopril 10mg daily. During his last appointment, he also complained of occasional chest discomfort and \"soreness\" in the back of his neck (reminiscent of symptoms he experienced prior to his stents in 2011), so a pharmacologic nuclear stress test to assess for progression of disease was requested. The stress test was completed on 1/29/19 and it showed abnormal myocardial perfusion imaging. Fixed defect consistent with prior myocardial infarction. Myocardial perfusion imaging supports an intermediate risk stress test.  As compared to the previous study, there are no significant changes. *Note:  He is listed to have an allergy to beta blockers (symptomatic bradycardia) but he states that the carvedilol has not affected him as other beta blockers used in the past.**  While reviewing his medications, he states that his ASA was discontinued some time ago as he develops lower extremity edema when he takes aspirin. He is a 66year old male with history of CAD (s/p stent to the RCA in Feb. 2011 and stent to the LAD in March 2011). He also has history of dyslipidemia and some non-compliance in the past with taking his medications mainly due to financial constraints. He has not wanted to take a beta blocker because he states that he had an episode of \"low heart rate\" in the past.  He presented to the hospital on 6/21/16, with complaints of exertional chest pain that began 4 to 5 days prior to presenting to the hospital.  He states that symptoms began while he was mowing grass and had to stop to rest several times so the pain would go away. Initial EKG showed no acute changes and initial troponin was within normal limits.   He underwent cardiac catheterization on 6/23/16 which showed a new distal 90% immediately proximal to the bifurcation for which he underwent stenting with a 2.75 mm Xience BRANDI (12 mm length) stent. He was started on Plavix. He had an echo done in August 2018, and it showed and EF of 40%, mild concentric LVH, grade 2 diastolic dysfunction, and PASP of 37 mmHg. He was last seen by Dr Alejo Officer on 7/31/18. He states that he is feeling somewhat better and has been compliant with his medications. He has not noticed anymore lower extremity edema since he decreased his fluid intake. He states that he is not limiting his sodium intake. He denies chest pain, tightness, heaviness, and admits to occasional palpitations. He denies shortness of breath at rest, dyspnea on exertion, orthopnea and PND. He denies abdominal bloating. He denies lightheadedness, dizziness, and syncope. He denies lower extremity edema and denies claudication. Denies nausea, vomiting, diarrhea, fever, chills. He admits to being \"hyperactive\" and is not able to sit for prolonged periods of time. PMH: 
Past Medical History:  
Diagnosis Date  Anxiety  Cardiomyopathy ([address] EF 50% (GATED NUC 7/12),  40-45% (VENTRICULOGRAPHY 5/13),  50% (ECHO 2/15),  39% (GATED NUC 2/15)  Cervical pain  Coronary artery disease RCA - 3.5 x 23mm VISION 2/11,  LAD - 3.0 x 18mm XIENCE 3/11  Dyslipidemia  High cholesterol  Noncompliance  Tobacco abuse PSH: 
Past Surgical History:  
Procedure Laterality Date  CARDIAC CATHETERIZATION  6/23/2016  CORONARY ARTERY ANGIOGRAM  6/23/2016  CORONARY STENT SINGLE W/PTCA  6/23/2016  LV ANGIOGRAPHY  6/23/2016  STENT INSERTION    
 
 
MEDS: 
Current Outpatient Medications Medication Sig  
 lisinopril (PRINIVIL, ZESTRIL) 10 mg tablet Take 1 Tab by mouth daily.  carvedilol (COREG) 6.25 mg tablet Take 1 Tab by mouth two (2) times daily (with meals).  rosuvastatin (CRESTOR) 10 mg tablet Take 10 mg by mouth nightly.  nitroglycerin (NITROSTAT) 0.4 mg SL tablet 1 Tab by SubLINGual route every five (5) minutes as needed for Chest Pain. No current facility-administered medications for this visit. Allergies and Sensitivities: 
Allergies Allergen Reactions  Beta Blocker [Beta-Blockers (Beta-Adrenergic Blocking Agts)] Other (comments)  
  symptomatic bradycardia  Pcn [Penicillins] Swelling Family History: 
Family History Problem Relation Age of Onset  Hypertension Mother  Diabetes Mother  Colon Cancer Mother 79  
 Heart Disease Father Social History: He  reports that he has been smoking cigarettes. He has a 3.75 pack-year smoking history. he has never used smokeless tobacco.  He  reports that he does not drink alcohol. Physical: 
Visit Vitals /86 Pulse 90 Ht 6' 1\" (1.854 m) Wt 94.3 kg (208 lb) SpO2 97% BMI 27.44 kg/m² His weight is down 4 pounds since his last appointment. Exam: 
Neck:  Supple, no JVD, no carotid bruits CV:  Normal S1 and  S2, no murmurs, rubs, or gallops noted Lungs:  Clear to ausculation throughout, no wheezes or rales Abd:  Soft, non-tender, non-distended with good bowel sounds. No hepatosplenomegaly Extremities:  No lower extremity edema Data: 
EKG:     
 
 
LABS: 
Lab Results Component Value Date/Time Sodium 139 06/23/2016 12:53 PM  
 Potassium 4.1 06/23/2016 12:53 PM  
 Chloride 107 06/23/2016 12:53 PM  
 CO2 28 06/23/2016 12:53 PM  
 Glucose 159 (H) 06/23/2016 12:53 PM  
 BUN 25 (H) 06/23/2016 12:53 PM  
 Creatinine 1.09 06/23/2016 12:53 PM  
 
Lab Results Component Value Date/Time Cholesterol, total 204 (H) 06/22/2016 01:10 AM  
 HDL Cholesterol 45 06/22/2016 01:10 AM  
 LDL, calculated 134.2 (H) 06/22/2016 01:10 AM  
 Triglyceride 124 06/22/2016 01:10 AM  
 CHOL/HDL Ratio 4.5 06/22/2016 01:10 AM  
 
Lab Results Component Value Date/Time ALT (SGPT) 28 06/21/2016 06:41 PM  
 
 
 
Impression / Plan: 1.  CAD, s/p recent PCI/stent to RCA (6/23/16) and history of PCI/stents to RCA in Feb. 2011 and the LAD in March 2011 2. Dyslipidemia, currently not on a statin due to cost, but states that he will restart this medication 3. Palpitations Mr. Enriqueta Bautista was seen today for follow-up after being instructed to continue carvedilol 3.125mg BID and starting on lisinopril 10mg daily to assist with blood pressure control. He was also asked to have a stress test done as he complained of occasional chest discomfort and soreness in the back of his neck, reminiscent of symptoms he experienced prior to his stents being placed in 2011. The stress test was completed on 1/29/19 and it showed abnormal myocardial perfusion imaging. Fixed defect consistent with prior myocardial infarction. Myocardial perfusion imaging supports an intermediate risk stress test.  As compared to the previous study, there are no significant changes. Results of the stress test discussed with him and reassured that although the stress test was abnormal, there were no changes when compared to the previous stress test.  
 
His blood pressure is better controlled at present. To further optimize his  Medical therapy for LV dysfunction, he will be started on aldactone 25mg daily. He was instructed to continue all of his other medications and he states that he is probably going to restart his rosuvastatin which he stopped taking. His blood pressure will be re-evaluated when he returns for follow-up with Dr Esau Vazquez in mid April. He was given a lab slip for a BMP to be done in one month. Smoking cessation was discussed and highly encouraged. He is smoking about 1/2 pack per day. He will follow-up with Dr. Esau Vazquez as scheduled and as needed. Farida Verduzco MSN, FNP-BC Please note:  Portions of this chart were created with Dragon medical speech to text program.  Unrecognized errors may be present.

## 2019-02-12 ENCOUNTER — OFFICE VISIT (OUTPATIENT)
Dept: CARDIOLOGY CLINIC | Age: 59
End: 2019-02-12

## 2019-02-12 VITALS
WEIGHT: 208 LBS | SYSTOLIC BLOOD PRESSURE: 140 MMHG | DIASTOLIC BLOOD PRESSURE: 86 MMHG | OXYGEN SATURATION: 97 % | HEIGHT: 73 IN | BODY MASS INDEX: 27.57 KG/M2 | HEART RATE: 90 BPM

## 2019-02-12 DIAGNOSIS — I10 ESSENTIAL HYPERTENSION: Primary | ICD-10-CM

## 2019-02-12 DIAGNOSIS — I42.9 CARDIOMYOPATHY, UNSPECIFIED TYPE (HCC): ICD-10-CM

## 2019-02-12 DIAGNOSIS — I25.10 ATHEROSCLEROSIS OF NATIVE CORONARY ARTERY OF NATIVE HEART WITHOUT ANGINA PECTORIS: ICD-10-CM

## 2019-02-12 DIAGNOSIS — E78.5 DYSLIPIDEMIA: ICD-10-CM

## 2019-02-12 RX ORDER — SPIRONOLACTONE 25 MG/1
25 TABLET ORAL DAILY
Qty: 30 TAB | Refills: 6 | Status: SHIPPED | OUTPATIENT
Start: 2019-02-12 | End: 2019-04-16 | Stop reason: ALTCHOICE

## 2019-02-12 NOTE — PATIENT INSTRUCTIONS
Begin aldactone (spironolactone) 25mg once a day All other medications to remain the same BMP to be done in 3-4 weeks Follow-up with Dr. Alejo Officer as scheduled and as needed

## 2019-02-12 NOTE — PROGRESS NOTES
Javi Quinones. presents today for Chief Complaint Patient presents with  Hypertension 3 week follow up - no cardiac complaints Javi Quinones. preferred language for health care discussion is english/other. Is someone accompanying this pt? no 
 
Is the patient using any DME equipment during OV? no 
 
Depression Screening: No flowsheet data found. Learning Assessment: 
Learning Assessment 2/12/2015 PRIMARY LEARNER Patient PRIMARY LANGUAGE ENGLISH  
LEARNER PREFERENCE PRIMARY DEMONSTRATION  
ANSWERED BY patient RELATIONSHIP SELF Abuse Screening: No flowsheet data found. Fall Risk No flowsheet data found. Pt currently taking Anticoagulant therapy? no 
 
Coordination of Care: 1. Have you been to the ER, urgent care clinic since your last visit? Hospitalized since your last visit? no 
 
2. Have you seen or consulted any other health care providers outside of the [address] [place] [address] since your last visit? Include any pap smears or colon screening.  no

## 2019-02-19 ENCOUNTER — TELEPHONE (OUTPATIENT)
Dept: CARDIOLOGY CLINIC | Age: 59
End: 2019-02-19

## 2019-02-19 NOTE — TELEPHONE ENCOUNTER
Patient called stating he has stopped his spironolactone d/t increased fatigue and DING. Informed NP Ish Sandhu who states it's fine to discontinue, will evaluate at next office visit, or sooner if needed.

## 2019-04-16 ENCOUNTER — OFFICE VISIT (OUTPATIENT)
Dept: CARDIOLOGY CLINIC | Age: 59
End: 2019-04-16

## 2019-04-16 VITALS
OXYGEN SATURATION: 98 % | DIASTOLIC BLOOD PRESSURE: 76 MMHG | SYSTOLIC BLOOD PRESSURE: 106 MMHG | WEIGHT: 215 LBS | HEIGHT: 73 IN | BODY MASS INDEX: 28.49 KG/M2 | HEART RATE: 62 BPM

## 2019-04-16 DIAGNOSIS — I25.119 ATHEROSCLEROSIS OF NATIVE CORONARY ARTERY OF NATIVE HEART WITH ANGINA PECTORIS (HCC): Primary | ICD-10-CM

## 2019-04-16 RX ORDER — ASPIRIN 81 MG/1
81 TABLET ORAL DAILY
Qty: 30 TAB | Refills: 6 | Status: SHIPPED | OUTPATIENT
Start: 2019-04-16

## 2019-04-16 NOTE — PROGRESS NOTES
HISTORY OF PRESENT ILLNESS Shanika Valdez is a 66 y.o. male. ASSESSMENT and PLAN Mr. Hue Mukherjee has history of CAD. Back in 2011, he had Vision stent to the RCA and Xience stent to the LAD at [facility]. He presented with unstable angina to [facility] in June 2016. He had new distal 90% RCA stenosis proximal to the bifurcation. This was successfully stented using science BRANDI. He could not tolerate beta blockers in the past and previously had refused statins. However, because of significant dyslipidemia, he is now on Crestor 20 mg daily. · CAD:    Symptomatically stable. Remains active physically. · BP:   Well controlled. · HR:    Stable. · CHF:    There is no evidence of decompensated CHF noted. · Weight:    His weight today is 215 pounds. It is at his baseline. · Cholesterol:   Target LDL <70. He remains on Crestor 10 mg daily. · Tobacco: Unfortunately, he still smokes a pack every few weeks. Again, lengthy discussion and education were given. Over 20 minutes spent on discussion alone. · Anti-platelet:    He was told to discontinue aspirin by his PCP. I reemphasized the fact that he needs to stay on baby aspirin daily with his known history of aggressive CAD. I will see him back in 6-9 months. Thank you. Encounter Diagnoses Name Primary?  Atherosclerosis of native coronary artery of native heart with angina pectoris ([facility] [address] Yes  
 
current treatment plan is effective, no change in therapy 
lab results and schedule of future lab studies reviewed with patient 
reviewed diet, exercise and weight control 
very strongly urged to quit smoking to reduce cardiovascular risk 
cardiovascular risk and specific lipid/LDL goals reviewed 
use of aspirin to prevent MI and TIA's discussed HPI Today, Mr. Jake Rosario has no complaints of chest pains, increased shortness of breath or decreased exercise capacity. He denies any orthopnea or PND. He denies any palpitations or dizziness. He remains active physically. Unfortunately, he still smokes about a pack every 2-3 weeks. Review of Systems Respiratory: Negative for shortness of breath. Cardiovascular: Negative for chest pain, palpitations, orthopnea, claudication, leg swelling and PND. All other systems reviewed and are negative. Physical Exam  
Constitutional: He is oriented to person, place, and time. He appears well-developed and well-nourished. HENT:  
Head: Normocephalic. Eyes: Conjunctivae are normal.  
Neck: Neck supple. No JVD present. Carotid bruit is not present. No thyromegaly present. Pulmonary/Chest: Breath sounds normal.  
Abdominal: Bowel sounds are normal.  
Musculoskeletal: He exhibits no edema. Neurological: He is alert and oriented to person, place, and time. Skin: Skin is warm and dry. Nursing note and vitals reviewed. PCP: GRAHAM Garcia Past Medical History:  
Diagnosis Date  Anxiety  Cardiomyopathy ([facility] [address] EF 50% (GATED NUC 7/12),  40-45% (VENTRICULOGRAPHY 5/13),  50% (ECHO 2/15),  39% (GATED NUC 2/15)  Cervical pain  Coronary artery disease RCA - 3.5 x 23mm VISION 2/11,  LAD - 3.0 x 18mm XIENCE 3/11  Dyslipidemia  High cholesterol  Noncompliance  Tobacco abuse Past Surgical History:  
Procedure Laterality Date  CARDIAC CATHETERIZATION  6/23/2016  CORONARY ARTERY ANGIOGRAM  6/23/2016  CORONARY STENT SINGLE W/PTCA  6/23/2016  LV ANGIOGRAPHY  6/23/2016  STENT INSERTION Current Outpatient Medications Medication Sig Dispense Refill  aspirin delayed-release 81 mg tablet Take 1 Tab by mouth daily. 30 Tab 6  
 lisinopril (PRINIVIL, ZESTRIL) 10 mg tablet Take 1 Tab by mouth daily. 30 Tab 6  carvedilol (COREG) 6.25 mg tablet Take 1 Tab by mouth two (2) times daily (with meals). 60 Tab 6  rosuvastatin (CRESTOR) 10 mg tablet Take 10 mg by mouth nightly.  nitroglycerin (NITROSTAT) 0.4 mg SL tablet 1 Tab by SubLINGual route every five (5) minutes as needed for Chest Pain. 25 Tab 1 The patient has a family history of 
 
Social History Tobacco Use  Smoking status: Current Every Day Smoker Packs/day: 0.25 Years: 15.00 Pack years: 3.75 Types: Cigarettes Last attempt to quit: 2016 Years since quittin.8  Smokeless tobacco: Never Used Substance Use Topics  Alcohol use: No  
 Drug use: Yes Types: Marijuana Comment: rarely Lab Results Component Value Date/Time Cholesterol, total 204 (H) 2016 01:10 AM  
 HDL Cholesterol 45 2016 01:10 AM  
 LDL, calculated 134.2 (H) 2016 01:10 AM  
 Triglyceride 124 2016 01:10 AM  
 CHOL/HDL Ratio 4.5 2016 01:10 AM  
  
 
BP Readings from Last 3 Encounters:  
19 106/76  
19 140/86  
19 132/88 Pulse Readings from Last 3 Encounters:  
19 62  
19 90  
19 60 Wt Readings from Last 3 Encounters:  
19 97.5 kg (215 lb)  
19 94.3 kg (208 lb)  
19 96.2 kg (212 lb) EKG: normal sinus rhythm, Q waves in 3.

## 2019-08-12 RX ORDER — LISINOPRIL 10 MG/1
10 TABLET ORAL DAILY
Qty: 30 TAB | Refills: 6 | Status: SHIPPED | OUTPATIENT
Start: 2019-08-12 | End: 2020-03-11

## 2019-08-12 RX ORDER — CARVEDILOL 6.25 MG/1
6.25 TABLET ORAL 2 TIMES DAILY WITH MEALS
Qty: 60 TAB | Refills: 6 | Status: SHIPPED | OUTPATIENT
Start: 2019-08-12 | End: 2019-08-26 | Stop reason: SDUPTHER

## 2019-08-12 RX ORDER — ROSUVASTATIN CALCIUM 10 MG/1
10 TABLET, COATED ORAL
Qty: 30 TAB | Refills: 6 | Status: SHIPPED | OUTPATIENT
Start: 2019-08-12 | End: 2020-09-10

## 2019-08-26 RX ORDER — CARVEDILOL 6.25 MG/1
6.25 TABLET ORAL 2 TIMES DAILY WITH MEALS
Qty: 60 TAB | Refills: 6 | Status: SHIPPED | OUTPATIENT
Start: 2019-08-26 | End: 2019-10-29

## 2019-10-29 ENCOUNTER — OFFICE VISIT (OUTPATIENT)
Dept: CARDIOLOGY CLINIC | Age: 59
End: 2019-10-29

## 2019-10-29 VITALS
HEART RATE: 49 BPM | OXYGEN SATURATION: 98 % | SYSTOLIC BLOOD PRESSURE: 118 MMHG | HEIGHT: 73 IN | BODY MASS INDEX: 28.63 KG/M2 | WEIGHT: 216 LBS | DIASTOLIC BLOOD PRESSURE: 70 MMHG

## 2019-10-29 DIAGNOSIS — I42.9 CARDIOMYOPATHY, UNSPECIFIED TYPE (HCC): Primary | ICD-10-CM

## 2019-10-29 DIAGNOSIS — E78.5 DYSLIPIDEMIA: ICD-10-CM

## 2019-10-29 DIAGNOSIS — R07.9 CHEST PAIN, UNSPECIFIED TYPE: ICD-10-CM

## 2019-10-29 DIAGNOSIS — I25.119 ATHEROSCLEROSIS OF NATIVE CORONARY ARTERY OF NATIVE HEART WITH ANGINA PECTORIS (HCC): ICD-10-CM

## 2019-10-29 DIAGNOSIS — I10 ESSENTIAL HYPERTENSION: ICD-10-CM

## 2019-10-29 RX ORDER — CARVEDILOL 3.12 MG/1
3.12 TABLET ORAL 2 TIMES DAILY WITH MEALS
Qty: 60 TAB | Refills: 6 | Status: SHIPPED | OUTPATIENT
Start: 2019-10-29 | End: 2020-07-06

## 2019-10-29 NOTE — PROGRESS NOTES
HISTORY OF PRESENT Dl Kingston. is a 66 y.o. male. ASSESSMENT and PLAN    Mr. Arvind Ascencio has history of CAD.  Back in 2011, he had Vision stent to the RCA and Xience stent to the [address] [place] [address]. He presented with unstable angina [facility] had new distal 90% RCA stenosis proximal to the bifurcation.  This was successfully stented using science BRANDI. He could not tolerate beta blockers in the past and previously had refused statins.  However, because of significant dyslipidemia, he is now on Crestor 20 mg daily. · CAD:    Clinically stable. · BP:   Well controlled. · HR:    Asymptomatic sinus bradycardia. Because of concern for bradycardia causing his lethargy, I have decreased his carvedilol to 3.125 mg twice daily. · CHF:    There is no evidence of decompensated CHF noted. · Weight:    His weight today is 216 pounds. This is near his baseline. · Cholesterol:   Target LDL <70. Crestor 10 daily. · Tobacco: Unfortunately, he still smokes 1 pack weekly. Again, strong recommendation was given to discontinue tobacco use completely. · Anti-platelet:   Remains on ASA. I will see him back in 6-9 months.  Thank you. Encounter Diagnoses   Name Primary?     Cardiomyopathy, unspecified type ([address] Yes    Atherosclerosis of native coronary artery of native heart with angina pectoris ([address]     Essential hypertension     Dyslipidemia     Chest pain, unspecified type      current treatment plan is effective, no change in therapy  lab results and schedule of future lab studies reviewed with patient  reviewed diet, exercise and weight control  very strongly urged to quit smoking to reduce cardiovascular risk  cardiovascular risk and specific lipid/LDL goals reviewed  use of aspirin to prevent MI and TIA's discussed        HPI   Today, Mr. Erika Miranda has no complaints of chest pains, increased shortness of breath or decreased exercise capacity. He has lethargy and fatigue. He feels that at his age, he should be more energetic. He denies any orthopnea or PND. He denies any palpitations or dizziness. Review of Systems   Constitutional: Positive for malaise/fatigue. Respiratory: Negative for shortness of breath. Cardiovascular: Negative for chest pain, palpitations, orthopnea, claudication, leg swelling and PND. All other systems reviewed and are negative. Physical Exam   Constitutional: He is oriented to person, place, and time. He appears well-developed and well-nourished. HENT:   Head: Normocephalic. Eyes: Conjunctivae are normal.   Neck: Neck supple. No JVD present. Carotid bruit is not present. No thyromegaly present. Cardiovascular: Regular rhythm. Bradycardia present. Pulmonary/Chest: Breath sounds normal.   Abdominal: Bowel sounds are normal.   Musculoskeletal: He exhibits no edema. Neurological: He is alert and oriented to person, place, and time. Skin: Skin is warm and dry. Nursing note and vitals reviewed. PCP: GRAHAM Hernandez    Past Medical History:   Diagnosis Date    Anxiety     Cardiomyopathy ([facility] [address]     EF 50% (GATED NUC 7/12),  40-45% (VENTRICULOGRAPHY 5/13),  50% (ECHO 2/15),  39% (GATED NUC 2/15)    Cervical pain     Coronary artery disease     RCA - 3.5 x 23mm VISION 2/11,  LAD - 3.0 x 18mm XIENCE 3/11    Dyslipidemia     High cholesterol     Noncompliance     Tobacco abuse        Past Surgical History:   Procedure Laterality Date    CARDIAC CATHETERIZATION  6/23/2016         CORONARY ARTERY ANGIOGRAM  6/23/2016         CORONARY STENT SINGLE W/PTCA  6/23/2016         LV ANGIOGRAPHY  6/23/2016         STENT INSERTION         Current Outpatient Medications   Medication Sig Dispense Refill    carvedilol (COREG) 3.125 mg tablet Take 1 Tab by mouth two (2) times daily (with meals). 60 Tab 6    lisinopril (PRINIVIL, ZESTRIL) 10 mg tablet Take 1 Tab by mouth daily.  30 Tab 6    rosuvastatin (CRESTOR) 10 mg tablet Take 1 Tab by mouth nightly. 30 Tab 6    aspirin delayed-release 81 mg tablet Take 1 Tab by mouth daily. 30 Tab 6    nitroglycerin (NITROSTAT) 0.4 mg SL tablet 1 Tab by SubLINGual route every five (5) minutes as needed for Chest Pain. 25 Tab 1       The patient has a family history of    Social History     Tobacco Use    Smoking status: Current Every Day Smoker     Packs/day: 0.25     Years: 15.00     Pack years: 3.75     Types: Cigarettes     Last attempt to quit: 6/24/2016     Years since quitting: 3.3    Smokeless tobacco: Never Used   Substance Use Topics    Alcohol use: No    Drug use: Yes     Types: Marijuana     Comment: rarely       Lab Results   Component Value Date/Time    Cholesterol, total 204 (H) 06/22/2016 01:10 AM    HDL Cholesterol 45 06/22/2016 01:10 AM    LDL, calculated 134.2 (H) 06/22/2016 01:10 AM    Triglyceride 124 06/22/2016 01:10 AM    CHOL/HDL Ratio 4.5 06/22/2016 01:10 AM        BP Readings from Last 3 Encounters:   10/29/19 118/70   04/16/19 106/76   02/12/19 140/86        Pulse Readings from Last 3 Encounters:   10/29/19 (!) 49   04/16/19 62   02/12/19 90       Wt Readings from Last 3 Encounters:   10/29/19 98 kg (216 lb)   04/16/19 97.5 kg (215 lb)   02/12/19 94.3 kg (208 lb)         EKG: nonspecific ST and T waves changes, sinus bradycardia.

## 2019-10-29 NOTE — PROGRESS NOTES
Chemo Yao. presents today for   Chief Complaint   Patient presents with    Coronary Artery Disease     6 month follow up     Lethargy     daily        Delio Vences Jr. preferred language for health care discussion is english/other. Is someone accompanying this pt? no    Is the patient using any DME equipment during [address]? no    Depression Screening:  3 most recent PHQ Screens 10/29/2019   Little interest or pleasure in doing things Not at all   Feeling down, depressed, irritable, or hopeless Not at all   Total Score PHQ 2 0       Learning Assessment:  Learning Assessment 4/16/2019   PRIMARY LEARNER Patient   PRIMARY LANGUAGE ENGLISH   LEARNER PREFERENCE PRIMARY DEMONSTRATION   ANSWERED BY patient   RELATIONSHIP SELF       Abuse Screening:  Abuse Screening Questionnaire 4/16/2019   Do you ever feel afraid of your partner? N   Are you in a relationship with someone who physically or mentally threatens you? N   Is it safe for you to go home? Y       Fall Risk  No flowsheet data found. Pt currently taking Anticoagulant therapy? ASA 81mg every day     Coordination of Care:  1. Have you been to the ER, urgent care clinic since your last visit? Hospitalized since your last visit? no    2. Have you seen or consulted any other health care providers outside of the [address] [place] [address] since your last visit? Include any pap smears or colon screening.  no

## 2019-12-12 ENCOUNTER — HOSPITAL ENCOUNTER (OUTPATIENT)
Dept: CT IMAGING | Age: 59
Discharge: HOME | End: 2019-12-12
Attending: NURSE PRACTITIONER
Payer: COMMERCIAL

## 2019-12-12 DIAGNOSIS — R10.12 LEFT UPPER QUADRANT PAIN: ICD-10-CM

## 2019-12-12 DIAGNOSIS — R13.10 DYSPHAGIA: ICD-10-CM

## 2019-12-12 DIAGNOSIS — Z86.010 PERSONAL HISTORY OF COLONIC POLYPS: ICD-10-CM

## 2019-12-12 PROCEDURE — 74150 CT ABDOMEN W/O CONTRAST: CPT

## 2019-12-26 ENCOUNTER — HOSPITAL ENCOUNTER (OUTPATIENT)
Dept: GENERAL RADIOLOGY | Age: 59
Discharge: HOME | End: 2019-12-26
Attending: NURSE PRACTITIONER
Payer: COMMERCIAL

## 2019-12-26 DIAGNOSIS — R10.12 LEFT UPPER QUADRANT PAIN: ICD-10-CM

## 2019-12-26 DIAGNOSIS — R13.10 DYSPHAGIA: ICD-10-CM

## 2019-12-26 DIAGNOSIS — Z86.010 PERSONAL HISTORY OF COLONIC POLYPS: ICD-10-CM

## 2019-12-26 PROCEDURE — 74011000255 HC RX REV CODE- 255: Performed by: NURSE PRACTITIONER

## 2019-12-26 PROCEDURE — 74011000250 HC RX REV CODE- 250: Performed by: NURSE PRACTITIONER

## 2019-12-26 PROCEDURE — 74220 X-RAY XM ESOPHAGUS 1CNTRST: CPT

## 2019-12-26 RX ADMIN — ANTACID/ANTIFLATULENT 4 G: 380; 550; 10; 10 GRANULE, EFFERVESCENT ORAL at 08:20

## 2019-12-26 RX ADMIN — BARIUM SULFATE 135 ML: 980 POWDER, FOR SUSPENSION ORAL at 08:20

## 2019-12-26 RX ADMIN — BARIUM SULFATE 700 MG: 700 TABLET ORAL at 08:20

## 2019-12-26 RX ADMIN — BARIUM SULFATE 88 G: 960 POWDER, FOR SUSPENSION ORAL at 08:20

## 2020-03-11 RX ORDER — LISINOPRIL 10 MG/1
TABLET ORAL
Qty: 30 TAB | Refills: 6 | Status: SHIPPED | OUTPATIENT
Start: 2020-03-11 | End: 2020-10-15 | Stop reason: SDUPTHER

## 2020-07-06 RX ORDER — CARVEDILOL 3.12 MG/1
TABLET ORAL
Qty: 60 TAB | Refills: 6 | Status: SHIPPED | OUTPATIENT
Start: 2020-07-06 | End: 2021-07-02 | Stop reason: SINTOL

## 2020-09-10 RX ORDER — ROSUVASTATIN CALCIUM 10 MG/1
TABLET, COATED ORAL
Qty: 30 TAB | Refills: 6 | Status: SHIPPED | OUTPATIENT
Start: 2020-09-10 | End: 2021-07-02

## 2020-10-15 RX ORDER — LISINOPRIL 10 MG/1
TABLET ORAL
Qty: 30 TAB | Refills: 6 | Status: SHIPPED | OUTPATIENT
Start: 2020-10-15 | End: 2021-05-26 | Stop reason: SDUPTHER

## 2020-10-20 RX ORDER — ROSUVASTATIN CALCIUM 10 MG/1
10 TABLET, COATED ORAL
Qty: 30 TAB | Refills: 6 | Status: SHIPPED | OUTPATIENT
Start: 2020-10-20 | End: 2021-07-02 | Stop reason: SDUPTHER

## 2021-05-26 RX ORDER — LISINOPRIL 10 MG/1
TABLET ORAL
Qty: 30 TABLET | Refills: 5 | Status: SHIPPED | OUTPATIENT
Start: 2021-05-26 | End: 2021-07-02 | Stop reason: SDUPTHER

## 2021-07-02 ENCOUNTER — OFFICE VISIT (OUTPATIENT)
Dept: CARDIOLOGY CLINIC | Age: 61
End: 2021-07-02
Payer: COMMERCIAL

## 2021-07-02 VITALS
HEIGHT: 73 IN | DIASTOLIC BLOOD PRESSURE: 62 MMHG | SYSTOLIC BLOOD PRESSURE: 112 MMHG | BODY MASS INDEX: 28.63 KG/M2 | WEIGHT: 216 LBS | OXYGEN SATURATION: 97 % | HEART RATE: 60 BPM

## 2021-07-02 DIAGNOSIS — I25.119 ATHEROSCLEROSIS OF NATIVE CORONARY ARTERY OF NATIVE HEART WITH ANGINA PECTORIS (HCC): Primary | ICD-10-CM

## 2021-07-02 DIAGNOSIS — I42.9 CARDIOMYOPATHY, UNSPECIFIED TYPE (HCC): ICD-10-CM

## 2021-07-02 DIAGNOSIS — R07.9 CHEST PAIN, UNSPECIFIED TYPE: ICD-10-CM

## 2021-07-02 DIAGNOSIS — I10 ESSENTIAL HYPERTENSION: ICD-10-CM

## 2021-07-02 PROCEDURE — 99204 OFFICE O/P NEW MOD 45 MIN: CPT | Performed by: INTERNAL MEDICINE

## 2021-07-02 PROCEDURE — 93000 ELECTROCARDIOGRAM COMPLETE: CPT | Performed by: INTERNAL MEDICINE

## 2021-07-02 RX ORDER — LISINOPRIL 10 MG/1
TABLET ORAL
Qty: 30 TABLET | Refills: 11 | Status: SHIPPED | OUTPATIENT
Start: 2021-07-02 | End: 2022-07-12

## 2021-07-02 RX ORDER — NITROGLYCERIN 0.4 MG/1
0.4 TABLET SUBLINGUAL
Qty: 25 TABLET | Refills: 3 | Status: SHIPPED | OUTPATIENT
Start: 2021-07-02

## 2021-07-02 RX ORDER — ROSUVASTATIN CALCIUM 10 MG/1
10 TABLET, COATED ORAL
Qty: 30 TABLET | Refills: 11 | Status: SHIPPED | OUTPATIENT
Start: 2021-07-02 | End: 2022-07-25

## 2021-07-02 NOTE — PROGRESS NOTES
Sangeetha Gaston. presents today for   Chief Complaint   Patient presents with    Follow-up     overdue        Sangeetha Gaston. preferred language for health care discussion is english/other. Is someone accompanying this pt? no    Is the patient using any DME equipment during [address]? no    Depression Screening:  3 most recent PHQ Screens 7/2/2021   Little interest or pleasure in doing things Not at all   Feeling down, depressed, irritable, or hopeless Not at all   Total Score PHQ 2 0       Learning Assessment:  Learning Assessment 4/16/2019   PRIMARY LEARNER Patient   PRIMARY LANGUAGE ENGLISH   LEARNER PREFERENCE PRIMARY DEMONSTRATION   ANSWERED BY patient   RELATIONSHIP SELF       Abuse Screening:  Abuse Screening Questionnaire 4/16/2019   Do you ever feel afraid of your partner? N   Are you in a relationship with someone who physically or mentally threatens you? N   Is it safe for you to go home? Y     Pt currently taking Anticoagulant therapy? ASA 81mg every day     Coordination of Care:  1. Have you been to the ER, urgent care clinic since your last visit? Hospitalized since your last visit? no    2. Have you seen or consulted any other health care providers outside of the [address] [place] [address] since your last visit? Include any pap smears or colon screening.  no

## 2021-07-02 NOTE — PROGRESS NOTES
HISTORY OF PRESENT ILLNESS  Maite Drake is a 64 y.o. male. ASSESSMENT and PLAN    Mr. Raimundo Garcia has history of CAD.  Back in 2011, he had Vision stent to the RCA and Xience stent to the [address] [place] [address]. He presented with unstable angina [facility] in June [facility] had new distal 90% RCA stenosis proximal to the bifurcation.  This was successfully stented using science BRANDI. He could not tolerate beta blockers in the past and previously had refused statins.  However, because of significant dyslipidemia, he is now on Crestor 20 mg daily.   His echocardiogram in 2018 revealed EF of 40% with mild to moderate mitral regurgitation. His nuclear scan in 2019 revealed intermediate risk with inferior fixed defect as well as inferolateral fixed defect. Calculated EF was 40%. · CAD:    Clinically stable. · BP:    Well controlled. · Rhythm:    Stable sinus. · CHF:    There is no evidence of decompensated CHF noted. · Weight:     His weight is 216 pounds. This is without significant changes. · Cholesterol:   Target LDL <70. Crestor 20. · Tobacco:    Unfortunately, he still smokes about a pack every few days. · Anti-platelet:   Remains on ASA. He states that he cannot tolerate Coreg even at very low dose. This was discontinued. Again, a lengthy discussion was carried on about importance of tobacco abstinence. All questions were answered. I will see him back in 6-9 months.  Thank you. Encounter Diagnoses   Name Primary?     Atherosclerosis of native coronary artery of native heart with angina pectoris ([address] Yes    Essential hypertension     Cardiomyopathy, unspecified type ([address]     Chest pain, unspecified type      current treatment plan is effective, no change in therapy  lab results and schedule of future lab studies reviewed with patient  reviewed diet, exercise and weight control  very strongly urged to quit smoking to reduce cardiovascular risk  cardiovascular risk and specific lipid/LDL goals reviewed  use of aspirin to prevent MI and TIA's discussed      HPI   Today, Mr. Timothy Brumfield has no complaints of chest pains. Unfortunately, he still smokes cigarettes. He does have dyspnea on exertion. He has not noted significant worsening. His exercise capacity is limited by dyspnea on exertion. He denies any orthopnea or PND. He denies any palpitations or dizziness. Review of Systems   Respiratory: Positive for shortness of breath. Cardiovascular: Negative for chest pain, palpitations, orthopnea, claudication, leg swelling and PND. All other systems reviewed and are negative. Physical Exam  Vitals and nursing note reviewed. Constitutional:       Appearance: Normal appearance. HENT:      Head: Normocephalic. Eyes:      Conjunctiva/sclera: Conjunctivae normal.   Cardiovascular:      Rate and Rhythm: Normal rate and regular rhythm. Pulmonary:      Breath sounds: Normal breath sounds. Abdominal:      Palpations: Abdomen is soft. Musculoskeletal:         General: No swelling. Cervical back: No rigidity. Skin:     General: Skin is warm and dry. Neurological:      General: No focal deficit present. Mental Status: He is alert and oriented to person, place, and time.    Psychiatric:         Mood and Affect: Mood normal.         Behavior: Behavior normal.         PCP: GRAHAM Joyner    Past Medical History:   Diagnosis Date    Anxiety     Cardiomyopathy ([facility]     EF 50% (GATED NUC 7/12),  40-45% (VENTRICULOGRAPHY 5/13),  50% (ECHO 2/15),  39% (GATED NUC 2/15)    Cervical pain     Coronary artery disease     RCA - 3.5 x 23mm VISION 2/11,  LAD - 3.0 x 18mm XIENCE 3/11    Dyslipidemia     High cholesterol     Noncompliance     Tobacco abuse        Past Surgical History:   Procedure Laterality Date    CARDIAC CATHETERIZATION  6/23/2016         CORONARY ARTERY ANGIOGRAM  6/23/2016         CORONARY STENT SINGLE W/PTCA 2016         LV ANGIOGRAPHY  2016         STENT INSERTION         Current Outpatient Medications   Medication Sig Dispense Refill    lisinopriL (PRINIVIL, ZESTRIL) 10 mg tablet TAKE 1 TABLET BY MOUTH ONCE DAILY 30 Tablet 11    rosuvastatin (Crestor) 10 mg tablet Take 1 Tablet by mouth nightly. 30 Tablet 11    nitroglycerin (NITROSTAT) 0.4 mg SL tablet 1 Tablet by SubLINGual route every five (5) minutes as needed for Chest Pain. 25 Tablet 3    aspirin delayed-release 81 mg tablet Take 1 Tab by mouth daily. 30 Tab 6       The patient has a family history of    Social History     Tobacco Use    Smoking status: Current Every Day Smoker     Packs/day: 0.25     Years: 15.00     Pack years: 3.75     Types: Cigarettes     Last attempt to quit: 2016     Years since quittin.0    Smokeless tobacco: Never Used   Substance Use Topics    Alcohol use: No    Drug use: Yes     Types: Marijuana     Comment: rarely       Lab Results   Component Value Date/Time    Cholesterol, total 204 (H) 2016 01:10 AM    HDL Cholesterol 45 2016 01:10 AM    LDL, calculated 134.2 (H) 2016 01:10 AM    Triglyceride 124 2016 01:10 AM    CHOL/HDL Ratio 4.5 2016 01:10 AM        BP Readings from Last 3 Encounters:   21 112/62   10/29/19 118/70   19 106/76        Pulse Readings from Last 3 Encounters:   21 60   10/29/19 (!) 49   19 62       Wt Readings from Last 3 Encounters:   21 98 kg (216 lb)   10/29/19 98 kg (216 lb)   19 97.5 kg (215 lb)         EKG: unchanged from previous tracings, normal sinus rhythm, Q waves in inferior leads, lead III and aVF as well as decreased precordial R wave voltage.

## 2022-03-03 NOTE — PATIENT INSTRUCTIONS
Begin carvedilol 3.125mg twice a day Follow-up with Nilesh Macdonald in 3 weeks Follow-up with Dr Urban Grewal as scheduled and as needed Dilated Cardiomyopathy: Care Instructions Your Care Instructions Dilated cardiomyopathy is a condition that weakens your heart muscle and causes it to stretch, or dilate. When your heart muscle is weak, it can't pump out blood as well as it should. More blood stays in your heart after each heartbeat. As more blood fills and stays in the heart, the heart muscle stretches even more and gets even weaker. Many things can cause dilated cardiomyopathy. It can be caused by another disease or condition, such as high blood pressure or a heart attack. Some people have a family history of dilated cardiomyopathy. For some people, the cause is not known. You may not have any symptoms at first. Or you may have mild symptoms, such as feeling very tired or weak. If your heart gets weaker, you may develop heart failure. Heart failure means that your heart muscle doesn't pump as much blood as your body needs. If this happens, you will feel other symptoms such as shortness of breath or trouble breathing when you lie down. The goal of treatment is to slow the disease and help you feel better. You may also have treatment for the cause of the cardiomyopathy. You will probably take a few medicines. If your doctor thinks it will help your heart and prevent problems, you may get a device such as a pacemaker. Self-care is another important part of your treatment. It includes the things you can do every day to feel better and stay as healthy as possible. Follow-up care is a key part of your treatment and safety. Be sure to make and go to all appointments, and call your doctor if you are having problems. It's also a good idea to know your test results and keep a list of the medicines you take. How can you care for yourself at home? Medicines   · Be safe with medicines. Take your medicines exactly as prescribed. Call your doctor if you think you are having a problem with your medicine. You may be taking some of the following medicines: 
? Angiotensin-converting enzyme (ACE) inhibitors or angiotensin II receptor blockers (ARBs). These make it easier for blood to flow. ? Diuretics. These help remove excess fluid from the body. ? Beta-blockers. These slow the heart rate and can help the heart fill with blood more completely.  
 Heart-healthy lifestyle 
  · Be active. Exercise regularly, but don't exercise too hard. If you aren't already active, your doctor may want you to start exercising. But don't start until you have talked with your doctor to make an exercise program that is safe for you.  
  · Do not smoke. Smoking can make a heart condition worse. If you need help quitting, talk to your doctor about stop-smoking programs and medicines. These can increase your chances of quitting for good.  
  · Eat a heart-healthy diet.  
  · Stay at a healthy weight. Lose weight if you need to.  
  · If your doctor recommends it, limit sodium. This helps keep fluid from building up in your body. It may help you feel better.  
 Weight monitoring 
  · Weigh yourself without clothing at the same time each day. Record your weight. Call your doctor if you have a sudden weight gain, such as more than 2 to 3 pounds in a day or 5 pounds in a week. (Your doctor may suggest a different range of weight gain.) A sudden weight gain may mean that your condition is getting worse. When should you call for help? Call 911 anytime you think you may need emergency care. For example, call if: 
  · You have symptoms of sudden heart failure. These may include: 
? Severe trouble breathing. ? A fast or irregular heartbeat. ? Coughing up pink, foamy mucus. ? Passing out.  
[facility] your doctor now or seek immediate medical care if:   · You have new or changed symptoms of heart failure, such as: ? New or increased shortness of breath. ? New or worse swelling in your legs, ankles, or feet. ? Sudden weight gain, such as more than 2 to 3 pounds in a day or 5 pounds in a week. (Your doctor may suggest a different range of weight gain.) ? Feeling dizzy or lightheaded or like you may faint. ? Feeling so tired or weak that you cannot do your usual activities. ? Not sleeping well. Shortness of breath wakes you at night. You need extra pillows to prop yourself up to breathe easier.  
 Watch closely for changes in your health, and be sure to contact your doctor if you have any problems. Where can you learn more? Go to http://katelin-alla.info/. Enter B164 in the search box to learn more about \"Dilated Cardiomyopathy: Care Instructions. \" Current as of: December 6, 2017 Content Version: 11.8 © 1215-7741 [employer]. Care instructions adapted under license by [employer] (which disclaims liability or warranty for this information). If you have questions about a medical condition or this instruction, always ask your healthcare professional. [place] [address] any warranty or liability for your use of this information. High Blood Pressure: Care Instructions Your Care Instructions If your blood pressure is usually above 130/80, you have high blood pressure, or hypertension. That means the top number is 130 or higher or the bottom number is 80 or higher, or both. Despite what a lot of people think, high blood pressure usually doesn't cause headaches or make you feel dizzy or lightheaded. It usually has no symptoms. But it does increase your risk for heart attack, stroke, and kidney or eye damage. The higher your blood pressure, the more your risk increases. Your doctor will give you a goal for your blood pressure. Your goal will be based on your health and your age. Lifestyle changes, such as eating healthy and being active, are always important to help lower blood pressure. You might also take medicine to reach your blood pressure goal. 
Follow-up care is a key part of your treatment and safety. Be sure to make and go to all appointments, and call your doctor if you are having problems. It's also a good idea to know your test results and keep a list of the medicines you take. How can you care for yourself at home? Medical treatment · If you stop taking your medicine, your blood pressure will go back up. You may take one or more types of medicine to lower your blood pressure. Be safe with medicines. Take your medicine exactly as prescribed. Call your doctor if you think you are having a problem with your medicine. · Talk to your doctor before you start taking aspirin every day. Aspirin can help certain people lower their risk of a heart attack or stroke. But taking aspirin isn't right for everyone, because it can cause serious bleeding. · See your doctor regularly. You may need to see the doctor more often at first or until your blood pressure comes down. · If you are taking blood pressure medicine, talk to your doctor before you take decongestants or anti-inflammatory medicine, such as ibuprofen. Some of these medicines can raise blood pressure. · Learn how to check your blood pressure at home. Lifestyle changes · Stay at a healthy weight. This is especially important if you put on weight around the waist. Losing even 10 pounds can help you lower your blood pressure. · If your doctor recommends it, get more exercise. Walking is a good choice. Bit by bit, increase the amount you walk every day. Try for at least 30 minutes on most days of the week. You also may want to swim, bike, or do other activities. · Avoid or limit alcohol. Talk to your doctor about whether you can drink any alcohol.  
· Try to limit how much sodium you eat to less than 2,300 milligrams (mg) a day. Your doctor may ask you to try to eat less than 1,500 mg a day. · Eat plenty of fruits (such as bananas and oranges), vegetables, legumes, whole grains, and low-fat dairy products. · Lower the amount of saturated fat in your diet. Saturated fat is found in animal products such as milk, cheese, and meat. Limiting these foods may help you lose weight and also lower your risk for heart disease. · Do not smoke. Smoking increases your risk for heart attack and stroke. If you need help quitting, talk to your doctor about stop-smoking programs and medicines. These can increase your chances of quitting for good. When should you call for help? Call 911 anytime you think you may need emergency care. This may mean having symptoms that suggest that your blood pressure is causing a serious heart or blood vessel problem. Your blood pressure may be over 180/120. 
 For example, call 911 if: 
  · You have symptoms of a heart attack. These may include: 
? Chest pain or pressure, or a strange feeling in the chest. 
? Sweating. ? Shortness of breath. ? Nausea or vomiting. ? Pain, pressure, or a strange feeling in the back, neck, jaw, or upper belly or in one or both shoulders or arms. ? Lightheadedness or sudden weakness. ? A fast or irregular heartbeat.  
  · You have symptoms of a stroke. These may include: 
? Sudden numbness, tingling, weakness, or loss of movement in your face, arm, or leg, especially on only one side of your body. ? Sudden vision changes. ? Sudden trouble speaking. ? Sudden confusion or trouble understanding simple statements. ? Sudden problems with walking or balance. ? A sudden, severe headache that is different from past headaches.  
  · You have severe back or belly pain.  
 Do not wait until your blood pressure comes down on its own.  Get help right away. 
 Call your doctor now or seek immediate care if: 
  · Your blood pressure is much higher than normal (such as 180/120 or higher), but you don't have symptoms.  
  · You think high blood pressure is causing symptoms, such as: 
? Severe headache. 
? Blurry vision.  
 Watch closely for changes in your health, and be sure to contact your doctor if: 
  · Your blood pressure measures higher than your doctor recommends at least 2 times. That means the top number is higher or the bottom number is higher, or both.  
  · You think you may be having side effects from your blood pressure medicine. Where can you learn more? Go to http://katelin-alla.info/. Enter E668 in the search box to learn more about \"High Blood Pressure: Care Instructions. \" Current as of: December 6, 2017 Content Version: 11.8 © 4733-1880 [employer]. Care instructions adapted under license by [employer] (which disclaims liability or warranty for this information). If you have questions about a medical condition or this instruction, always ask your healthcare professional. [address] any warranty or liability for your use of this information. DASH Diet: Care Instructions Your Care Instructions The DASH diet is an eating plan that can help lower your blood pressure. DASH stands for Dietary Approaches to Stop Hypertension. Hypertension is high blood pressure. The DASH diet focuses on eating foods that are high in calcium, potassium, and magnesium. These nutrients can lower blood pressure. The foods that are highest in these nutrients are fruits, vegetables, low-fat dairy products, nuts, seeds, and legumes. But taking calcium, potassium, and magnesium supplements instead of eating foods that are high in those nutrients does not have the same effect. The DASH diet also includes whole grains, fish, and poultry. The DASH diet is one of several lifestyle changes your doctor may recommend to lower your high blood pressure.  Your doctor may also want you to decrease the amount of sodium in your diet. Lowering sodium while following the DASH diet can lower blood pressure even further than just the DASH diet alone. Follow-up care is a key part of your treatment and safety. Be sure to make and go to all appointments, and call your doctor if you are having problems. It's also a good idea to know your test results and keep a list of the medicines you take. How can you care for yourself at home? Following the DASH diet · Eat 4 to 5 servings of fruit each day. A serving is 1 medium-sized piece of fruit, ½ cup chopped or canned fruit, 1/4 cup dried fruit, or 4 ounces (½ cup) of fruit juice. Choose fruit more often than fruit juice. · Eat 4 to 5 servings of vegetables each day. A serving is 1 cup of lettuce or raw leafy vegetables, ½ cup of chopped or cooked vegetables, or 4 ounces (½ cup) of vegetable juice. Choose vegetables more often than vegetable juice. · Get 2 to 3 servings of low-fat and fat-free dairy each day. A serving is 8 ounces of milk, 1 cup of yogurt, or 1 ½ ounces of cheese. · Eat 6 to 8 servings of grains each day. A serving is 1 slice of bread, 1 ounce of dry cereal, or ½ cup of cooked rice, pasta, or cooked cereal. Try to choose whole-grain products as much as possible. · Limit lean meat, poultry, and fish to 2 servings each day. A serving is 3 ounces, about the size of a deck of cards. · Eat 4 to 5 servings of nuts, seeds, and legumes (cooked dried beans, lentils, and split peas) each week. A serving is 1/3 cup of nuts, 2 tablespoons of seeds, or ½ cup of cooked beans or peas. · Limit fats and oils to 2 to 3 servings each day. A serving is 1 teaspoon of vegetable oil or 2 tablespoons of salad dressing. · Limit sweets and added sugars to 5 servings or less a week. A serving is 1 tablespoon jelly or jam, ½ cup sorbet, or 1 cup of lemonade. · Eat less than 2,300 milligrams (mg) of sodium a day.  If you limit your sodium to 1,500 mg a day, you can lower your blood pressure even more. Tips for success · Start small. Do not try to make dramatic changes to your diet all at once. You might feel that you are missing out on your favorite foods and then be more likely to not follow the plan. Make small changes, and stick with them. Once those changes become habit, add a few more changes. · Try some of the following: ? Make it a goal to eat a fruit or vegetable at every meal and at snacks. This will make it easy to get the recommended amount of fruits and vegetables each day. ? Try yogurt topped with fruit and nuts for a snack or healthy dessert. ? Add lettuce, tomato, cucumber, and onion to sandwiches. ? Combine a ready-made pizza crust with low-fat mozzarella cheese and lots of vegetable toppings. Try using tomatoes, squash, spinach, broccoli, carrots, cauliflower, and onions. ? Have a variety of cut-up vegetables with a low-fat dip as an appetizer instead of chips and dip. ? Sprinkle sunflower seeds or chopped almonds over salads. Or try adding chopped walnuts or almonds to cooked vegetables. ? Try some vegetarian meals using beans and peas. Add garbanzo or kidney beans to salads. Make burritos and tacos with mashed morales beans or black beans. Where can you learn more? Go to http://katelin-alla.info/. Enter F605 in the search box to learn more about \"DASH Diet: Care Instructions. \" Current as of: December 6, 2017 Content Version: 11.8 © 3891-8723 [employer]. Care instructions adapted under license by [employer] (which disclaims liability or warranty for this information). If you have questions about a medical condition or this instruction, always ask your healthcare professional. [place] [address] any warranty or liability for your use of this information. Low Sodium Diet (2,000 Milligram): Care Instructions Your Care Instructions Too much sodium causes your body to hold on to extra water. This can raise your blood pressure and force your heart and kidneys to work harder. In very serious cases, this could cause you to be put in the hospital. It might even be life-threatening. By limiting sodium, you will feel better and lower your risk of serious problems. The most common source of sodium is salt. People get most of the salt in their diet from canned, prepared, and packaged foods. Fast food and restaurant meals also are very high in sodium. Your doctor will probably limit your sodium to less than 2,000 milligrams (mg) a day. This limit counts all the sodium in prepared and packaged foods and any salt you add to your food. Follow-up care is a key part of your treatment and safety. Be sure to make and go to all appointments, and call your doctor if you are having problems. It's also a good idea to know your test results and keep a list of the medicines you take. How can you care for yourself at home? Read food labels · Read labels on cans and food packages. The labels tell you how much sodium is in each serving. Make sure that you look at the serving size. If you eat more than the serving size, you have eaten more sodium. · Food labels also tell you the Percent Daily Value for sodium. Choose products with low Percent Daily Values for sodium. · Be aware that sodium can come in forms other than salt, including monosodium glutamate (MSG), sodium citrate, and sodium bicarbonate (baking soda). MSG is often added to Asian food. When you eat out, you can sometimes ask for food without MSG or added salt. Buy low-sodium foods · Buy foods that are labeled \"unsalted\" (no salt added), \"sodium-free\" (less than 5 mg of sodium per serving), or \"low-sodium\" (less than 140 mg of sodium per serving). Foods labeled \"reduced-sodium\" and \"light sodium\" may still have too much sodium. Be sure to read the label to see how much sodium you are getting. · Buy fresh vegetables, or frozen vegetables without added sauces. Buy low-sodium versions of canned vegetables, soups, and other canned goods. Prepare low-sodium meals · Cut back on the amount of salt you use in cooking. This will help you adjust to the taste. Do not add salt after cooking. One teaspoon of salt has about 2,300 mg of sodium. · Take the salt shaker off the table. · Flavor your food with garlic, lemon juice, onion, vinegar, herbs, and spices. Do not use soy sauce, lite soy sauce, steak sauce, onion salt, garlic salt, celery salt, mustard, or ketchup on your food. · Use low-sodium salad dressings, sauces, and ketchup. Or make your own salad dressings and sauces without adding salt. · Use less salt (or none) when recipes call for it. You can often use half the salt a recipe calls for without losing flavor. Other foods such as rice, pasta, and grains do not need added salt. · Rinse canned vegetables, and cook them in fresh water. This removes somebut not allof the salt. · Avoid water that is naturally high in sodium or that has been treated with water softeners, which add sodium. Call your local water company to find out the sodium content of your water supply. If you buy bottled water, read the label and choose a sodium-free brand. Avoid high-sodium foods · Avoid eating: 
? Smoked, cured, salted, and canned meat, fish, and poultry. ? Ham, terry, hot dogs, and luncheon meats. ? Regular, hard, and processed cheese and regular peanut butter. ? Crackers with salted tops, and other salted snack foods such as pretzels, chips, and salted popcorn. ? Frozen prepared meals, unless labeled low-sodium. ? Canned and dried soups, broths, and bouillon, unless labeled sodium-free or low-sodium. ? Canned vegetables, unless labeled sodium-free or low-sodium. ? Western Nguyen fries, pizza, tacos, and other fast foods.  
? Pickles, olives, ketchup, and other condiments, especially soy sauce, unless labeled sodium-free or low-sodium. Where can you learn more? Go to http://katelin-alla.info/. Enter C089 in the search box to learn more about \"Low Sodium Diet (2,000 Milligram): Care Instructions. \" Current as of: March 29, 2018 Content Version: 11.8 © 3710-7875 [employer]. Care instructions adapted under license by [email or web address] (which disclaims liability or warranty for this information). If you have questions about a medical condition or this instruction, always ask your healthcare professional. [address] any warranty or liability for your use of this information. I have personally performed a face to face diagnostic evaluation on this patient. I have reviewed the ACP note and agree with the history, exam and plan of care, except as noted.

## 2022-07-12 RX ORDER — LISINOPRIL 10 MG/1
TABLET ORAL
Qty: 30 TABLET | Refills: 11 | Status: SHIPPED | OUTPATIENT
Start: 2022-07-12

## 2022-07-25 RX ORDER — ROSUVASTATIN CALCIUM 10 MG/1
10 TABLET, COATED ORAL
Qty: 30 TABLET | Refills: 11 | Status: SHIPPED | OUTPATIENT
Start: 2022-07-25

## 2023-01-04 ENCOUNTER — TRANSCRIBE ORDER (OUTPATIENT)
Dept: SCHEDULING | Age: 63
End: 2023-01-04

## 2023-01-04 DIAGNOSIS — R31.9 HEMATURIA: Primary | ICD-10-CM

## 2023-01-13 ENCOUNTER — HOSPITAL ENCOUNTER (OUTPATIENT)
Dept: ULTRASOUND IMAGING | Age: 63
Discharge: HOME | End: 2023-01-13
Attending: PHYSICIAN ASSISTANT
Payer: COMMERCIAL

## 2023-01-13 DIAGNOSIS — R31.9 HEMATURIA: ICD-10-CM

## 2023-01-13 PROCEDURE — 76770 US EXAM ABDO BACK WALL COMP: CPT

## 2023-09-15 ENCOUNTER — TELEPHONE (OUTPATIENT)
Age: 63
End: 2023-09-15

## 2023-09-15 RX ORDER — NITROGLYCERIN 0.4 MG/1
0.4 TABLET SUBLINGUAL EVERY 5 MIN PRN
Qty: 25 TABLET | Refills: 3 | Status: SHIPPED | OUTPATIENT
Start: 2023-09-15

## 2023-09-15 NOTE — TELEPHONE ENCOUNTER
Patient called to make an appointment with Dr Nissa López due to chest discomfort for the last 5 days. Patient states he has only been taking his crestor no other medications until recently when he started having chest pressure he started aspirin 325 mg daily   Patient has no nitro . Patient state with his first stents he never had any pain or discomfort. This was concerning to him so he wanted to be seen and not go to the ER. Patient is off and on lasting couple of minutes in the center of chest. No other complaints . Verbal order and read back per Shaina Avalos MD  Appt made with Dr Nissa López but also encourage him to go to the ER for further evaluation. Sent Nitro into pharm ,    Patient was not taking lisinopril nor aspirin 81 mg daily for a while per patient.

## 2023-09-20 ENCOUNTER — OFFICE VISIT (OUTPATIENT)
Age: 63
End: 2023-09-20
Payer: COMMERCIAL

## 2023-09-20 VITALS
SYSTOLIC BLOOD PRESSURE: 122 MMHG | HEIGHT: 73 IN | HEART RATE: 64 BPM | BODY MASS INDEX: 26.77 KG/M2 | DIASTOLIC BLOOD PRESSURE: 78 MMHG | OXYGEN SATURATION: 97 % | WEIGHT: 202 LBS

## 2023-09-20 DIAGNOSIS — I25.119 ATHEROSCLEROSIS OF NATIVE CORONARY ARTERY OF NATIVE HEART WITH ANGINA PECTORIS (HCC): Primary | ICD-10-CM

## 2023-09-20 DIAGNOSIS — I25.119 ATHEROSCLEROSIS OF NATIVE CORONARY ARTERY OF NATIVE HEART WITH ANGINA PECTORIS (HCC): ICD-10-CM

## 2023-09-20 DIAGNOSIS — I42.9 CARDIOMYOPATHY, UNSPECIFIED TYPE (HCC): ICD-10-CM

## 2023-09-20 DIAGNOSIS — R07.9 CHEST PAIN, UNSPECIFIED TYPE: ICD-10-CM

## 2023-09-20 DIAGNOSIS — I10 ESSENTIAL HYPERTENSION: ICD-10-CM

## 2023-09-20 LAB
A/G RATIO: 1.6 RATIO (ref 1.1–2.6)
ALBUMIN SERPL-MCNC: 4.7 G/DL (ref 3.5–5)
ALP BLD-CCNC: 63 U/L (ref 40–125)
ALT SERPL-CCNC: 14 U/L (ref 5–40)
ANION GAP SERPL CALCULATED.3IONS-SCNC: 11 MMOL/L (ref 3–15)
AST SERPL-CCNC: 15 U/L (ref 10–37)
BILIRUB SERPL-MCNC: 0.6 MG/DL (ref 0.2–1.2)
BUN BLDV-MCNC: 25 MG/DL (ref 6–22)
CALCIUM SERPL-MCNC: 10.2 MG/DL (ref 8.4–10.5)
CHLORIDE BLD-SCNC: 104 MMOL/L (ref 98–110)
CO2: 25 MMOL/L (ref 20–32)
CREAT SERPL-MCNC: 1.3 MG/DL (ref 0.8–1.6)
GLOBULIN: 3 G/DL (ref 2–4)
GLOMERULAR FILTRATION RATE: >60 ML/MIN/1.73 SQ.M.
GLUCOSE: 119 MG/DL (ref 70–99)
HCT VFR BLD CALC: 44.3 % (ref 39.3–51.6)
HEMOGLOBIN: 15.2 G/DL (ref 13.1–17.2)
INR BLD: 0.99 (ref 0.89–1.29)
MCH RBC QN AUTO: 29 PG (ref 26–34)
MCHC RBC AUTO-ENTMCNC: 34 G/DL (ref 31–36)
MCV RBC AUTO: 84 FL (ref 80–95)
PDW BLD-RTO: 13.3 % (ref 10–15.5)
PLATELET # BLD: 236 K/UL (ref 140–440)
PMV BLD AUTO: 10 FL (ref 9–13)
POTASSIUM SERPL-SCNC: 4.9 MMOL/L (ref 3.5–5.5)
PROTHROMBIN TIME: 10.4 SEC (ref 9–13)
RBC: 5.3 M/UL (ref 3.8–5.8)
SODIUM BLD-SCNC: 140 MMOL/L (ref 133–145)
TOTAL PROTEIN: 7.7 G/DL (ref 6.2–8.1)
WBC: 8.7 K/UL (ref 4–11)

## 2023-09-20 PROCEDURE — 3074F SYST BP LT 130 MM HG: CPT | Performed by: INTERNAL MEDICINE

## 2023-09-20 PROCEDURE — 99204 OFFICE O/P NEW MOD 45 MIN: CPT | Performed by: INTERNAL MEDICINE

## 2023-09-20 PROCEDURE — 93000 ELECTROCARDIOGRAM COMPLETE: CPT | Performed by: INTERNAL MEDICINE

## 2023-09-20 PROCEDURE — 3078F DIAST BP <80 MM HG: CPT | Performed by: INTERNAL MEDICINE

## 2023-09-20 RX ORDER — ASPIRIN 325 MG
325 TABLET ORAL 2 TIMES DAILY
COMMUNITY

## 2023-09-20 RX ORDER — SODIUM CHLORIDE 9 MG/ML
INJECTION, SOLUTION INTRAVENOUS PRN
OUTPATIENT
Start: 2023-09-20

## 2023-09-20 RX ORDER — ROSUVASTATIN CALCIUM 20 MG/1
20 TABLET, COATED ORAL DAILY
COMMUNITY

## 2023-09-20 RX ORDER — SODIUM CHLORIDE 0.9 % (FLUSH) 0.9 %
5-40 SYRINGE (ML) INJECTION EVERY 12 HOURS SCHEDULED
OUTPATIENT
Start: 2023-09-20

## 2023-09-20 RX ORDER — SODIUM CHLORIDE 0.9 % (FLUSH) 0.9 %
5-40 SYRINGE (ML) INJECTION PRN
OUTPATIENT
Start: 2023-09-20

## 2023-09-20 ASSESSMENT — ANXIETY QUESTIONNAIRES
2. NOT BEING ABLE TO STOP OR CONTROL WORRYING: 0
GAD7 TOTAL SCORE: 0
5. BEING SO RESTLESS THAT IT IS HARD TO SIT STILL: 0
7. FEELING AFRAID AS IF SOMETHING AWFUL MIGHT HAPPEN: 0
1. FEELING NERVOUS, ANXIOUS, OR ON EDGE: 0
4. TROUBLE RELAXING: 0
6. BECOMING EASILY ANNOYED OR IRRITABLE: 0
3. WORRYING TOO MUCH ABOUT DIFFERENT THINGS: 0

## 2023-09-20 ASSESSMENT — PATIENT HEALTH QUESTIONNAIRE - PHQ9
SUM OF ALL RESPONSES TO PHQ QUESTIONS 1-9: 0
1. LITTLE INTEREST OR PLEASURE IN DOING THINGS: 0
2. FEELING DOWN, DEPRESSED OR HOPELESS: 0
SUM OF ALL RESPONSES TO PHQ QUESTIONS 1-9: 0
SUM OF ALL RESPONSES TO PHQ QUESTIONS 1-9: 0
SUM OF ALL RESPONSES TO PHQ9 QUESTIONS 1 & 2: 0
SUM OF ALL RESPONSES TO PHQ QUESTIONS 1-9: 0

## 2023-09-20 NOTE — PROGRESS NOTES
Jose Alfredo Blandon. presents today for   Chief Complaint   Patient presents with    Follow-up     Overdue: chest pressure       Eddie Hubbard Gretchen. preferred language for health care discussion is english/other. Is someone accompanying this pt? no    Is the patient using any DME equipment during OV? no    Depression Screening:  Depression: Not at risk (9/20/2023)    PHQ-2     PHQ-2 Score: 0        Learning Assessment:  Who is the primary learner? Patient    What is the preferred language for health care of the primary learner? ENGLISH    How does the primary learner prefer to learn new concepts? DEMONSTRATION    Answered By patient    Relationship to Learner SELF           Pt currently taking Anticoagulant therapy? no    Pt currently taking Antiplatelet therapy ? Aspirn 325 mg bid      Coordination of Care:  1. Have you been to the ER, urgent care clinic since your last visit? Hospitalized since your last visit? no    2. Have you seen or consulted any other health care providers outside of the [address] [place] [address] since your last visit? Include any pap smears or colon screening.  no

## 2023-09-20 NOTE — PROGRESS NOTES
HISTORY OF PRESENT ILLNESS  Eddie Jacome.  63 y.o. male     Chief Complaint   Patient presents with    Follow-up     Overdue: chest pressure       ASSESSMENT and PLAN    The primary encounter diagnosis was Atherosclerosis of native coronary artery of native heart with angina pectoris ([address]). Diagnoses of Essential hypertension, Cardiomyopathy, unspecified type ([address]), and Chest pain, unspecified type were also pertinent to this visit. Mr. Ananda Thomas has history of CAD. Back in 2011, he had Vision stent to the RCA and Xience stent to the LAD [facility]. [facility] He had new distal 90% RCA stenosis proximal to the bifurcation. This was successfully stented using science MARISA. He could not tolerate beta blockers in the past and previously had refused statins. However, because of significant dyslipidemia, he is now on Crestor 20 mg daily. His echocardiogram in 2018 revealed EF of 40% with mild to moderate mitral regurgitation. His nuclear scan in 2019 revealed intermediate risk with inferior fixed defect as well as inferolateral fixed defect. Calculated EF was 40%. He did not follow-up with cardiology between July 2021 and September 2023. He has trouble with medication noncompliance. He has intolerance to beta-blockers and Coreg. CAD:    Recently came back after greater than 2 years is because he started having his typical anginal symptoms again about a week ago. He has not had any rest pains but has had easily inducible chest pains and neck pains. With his classic recurrent chest pains, I will proceed with coronary angiography and possible revascularization. This was discussed with the patient at length. He understands wishes to proceed. BP:    Well controlled at 122/78. Rhythm:    Stable regular rhythm at 64 bpm.  CHF:    There is no evidence of decompensated CHF noted.   Weight:     His weight today is 202

## 2023-09-20 NOTE — PATIENT INSTRUCTIONS
Catheterization Instructions       You are scheduled to have a Left Heart Catheterization  on September 29, 2023  at 1145 am.    Please check in at 1030 am .      Please go to [facility] and park in the outpatient parking lot that is located around to the back of the hospital and enter through the [facility] building. Once you enter through the [facility] check in with the  there. The  will either give you directions or assist you in getting to the cath holding area. DR. [facility], [id number] [place] [address])     You are not to eat or drink anything after midnight the night before your procedure. Small sips of water to take your medications is ok. If you are diabetic, do not take your insulin/sugar pill the morning of the procedure. MEDICATION INSTRUCTIONS:   Please take your morning medications with the following special instructions:           [x]          Please make sure to take your Blood pressure medication : Lisinopril . [x]          Take your Aspirin. We encourage families to wait in the waiting room on the first floor while the procedure is being done. The Doctor will come out and talk with you as soon as the procedure is over. You will need someone to drive you home after you have been discharged from the hospital.     There is the possibility that you may spend the night in the hospital, depending on the results of the procedure. This will be determined after the procedure is done. If angioplasty or stent is planned, you will stay at least one day. If you or your family have any questions, please call our office Monday -Friday, 8:30 a.m.-4:30 p.m.,  at [phone], and ask to speak to one of the nurses.

## 2023-09-22 DIAGNOSIS — R07.9 CHEST PAIN, UNSPECIFIED TYPE: ICD-10-CM

## 2023-09-22 DIAGNOSIS — I25.119 ATHEROSCLEROSIS OF NATIVE CORONARY ARTERY OF NATIVE HEART WITH ANGINA PECTORIS (HCC): ICD-10-CM

## 2023-09-29 ENCOUNTER — HOSPITAL ENCOUNTER (OUTPATIENT)
Facility: HOSPITAL | Age: 63
Setting detail: OUTPATIENT SURGERY
Discharge: HOME | End: 2023-09-29
Attending: INTERNAL MEDICINE | Admitting: INTERNAL MEDICINE
Payer: COMMERCIAL

## 2023-09-29 VITALS
SYSTOLIC BLOOD PRESSURE: 103 MMHG | BODY MASS INDEX: 26.77 KG/M2 | RESPIRATION RATE: 17 BRPM | WEIGHT: 202 LBS | DIASTOLIC BLOOD PRESSURE: 81 MMHG | HEART RATE: 63 BPM | OXYGEN SATURATION: 97 % | HEIGHT: 73 IN

## 2023-09-29 DIAGNOSIS — I25.110 CORONARY ARTERY DISEASE INVOLVING NATIVE CORONARY ARTERY OF NATIVE HEART WITH UNSTABLE ANGINA PECTORIS (HCC): Primary | ICD-10-CM

## 2023-09-29 DIAGNOSIS — I20.0 UNSTABLE ANGINA (HCC): ICD-10-CM

## 2023-09-29 DIAGNOSIS — I25.119 ATHEROSCLEROSIS OF NATIVE CORONARY ARTERY OF NATIVE HEART WITH ANGINA PECTORIS (HCC): ICD-10-CM

## 2023-09-29 DIAGNOSIS — R07.9 CHEST PAIN, UNSPECIFIED TYPE: ICD-10-CM

## 2023-09-29 LAB
ECHO BSA: 2.17 M2
EKG ATRIAL RATE: 51 BPM
EKG DIAGNOSIS: NORMAL
EKG P AXIS: 60 DEGREES
EKG P-R INTERVAL: 182 MS
EKG Q-T INTERVAL: 396 MS
EKG QRS DURATION: 114 MS
EKG QTC CALCULATION (BAZETT): 364 MS
EKG R AXIS: 34 DEGREES
EKG T AXIS: 27 DEGREES
EKG VENTRICULAR RATE: 51 BPM

## 2023-09-29 PROCEDURE — C9600 PERC DRUG-EL COR STENT SING: HCPCS | Performed by: INTERNAL MEDICINE

## 2023-09-29 PROCEDURE — 92928 PRQ TCAT PLMT NTRAC ST 1 LES: CPT

## 2023-09-29 PROCEDURE — C1887 CATHETER, GUIDING: HCPCS | Performed by: INTERNAL MEDICINE

## 2023-09-29 PROCEDURE — 93005 ELECTROCARDIOGRAM TRACING: CPT | Performed by: INTERNAL MEDICINE

## 2023-09-29 PROCEDURE — C1725 CATH, TRANSLUMIN NON-LASER: HCPCS | Performed by: INTERNAL MEDICINE

## 2023-09-29 PROCEDURE — 99153 MOD SED SAME PHYS/QHP EA: CPT | Performed by: INTERNAL MEDICINE

## 2023-09-29 PROCEDURE — 6360000002 HC RX W HCPCS: Performed by: INTERNAL MEDICINE

## 2023-09-29 PROCEDURE — 99152 MOD SED SAME PHYS/QHP 5/>YRS: CPT | Performed by: INTERNAL MEDICINE

## 2023-09-29 PROCEDURE — 92928 PRQ TCAT PLMT NTRAC ST 1 LES: CPT | Performed by: INTERNAL MEDICINE

## 2023-09-29 PROCEDURE — 93010 ELECTROCARDIOGRAM REPORT: CPT | Performed by: INTERNAL MEDICINE

## 2023-09-29 PROCEDURE — 2580000003 HC RX 258: Performed by: INTERNAL MEDICINE

## 2023-09-29 PROCEDURE — C9601 PERC DRUG-EL COR STENT BRAN: HCPCS | Performed by: INTERNAL MEDICINE

## 2023-09-29 PROCEDURE — 2709999900 HC NON-CHARGEABLE SUPPLY: Performed by: INTERNAL MEDICINE

## 2023-09-29 PROCEDURE — C1894 INTRO/SHEATH, NON-LASER: HCPCS | Performed by: INTERNAL MEDICINE

## 2023-09-29 PROCEDURE — 6370000000 HC RX 637 (ALT 250 FOR IP): Performed by: INTERNAL MEDICINE

## 2023-09-29 PROCEDURE — 2500000003 HC RX 250 WO HCPCS: Performed by: INTERNAL MEDICINE

## 2023-09-29 PROCEDURE — C1874 STENT, COATED/COV W/DEL SYS: HCPCS | Performed by: INTERNAL MEDICINE

## 2023-09-29 PROCEDURE — C1769 GUIDE WIRE: HCPCS | Performed by: INTERNAL MEDICINE

## 2023-09-29 PROCEDURE — C1713 ANCHOR/SCREW BN/BN,TIS/BN: HCPCS | Performed by: INTERNAL MEDICINE

## 2023-09-29 PROCEDURE — 6360000004 HC RX CONTRAST MEDICATION: Performed by: INTERNAL MEDICINE

## 2023-09-29 PROCEDURE — 76000 FLUOROSCOPY <1 HR PHYS/QHP: CPT | Performed by: INTERNAL MEDICINE

## 2023-09-29 PROCEDURE — 93458 L HRT ARTERY/VENTRICLE ANGIO: CPT | Performed by: INTERNAL MEDICINE

## 2023-09-29 PROCEDURE — 92920 PRQ TRLUML C ANGIOP 1ART&/BR: CPT | Performed by: INTERNAL MEDICINE

## 2023-09-29 RX ORDER — SODIUM CHLORIDE 9 MG/ML
INJECTION, SOLUTION INTRAVENOUS PRN
Status: DISCONTINUED | OUTPATIENT
Start: 2023-09-29 | End: 2023-09-29 | Stop reason: HOSPADM

## 2023-09-29 RX ORDER — SODIUM CHLORIDE 0.9 % (FLUSH) 0.9 %
5-40 SYRINGE (ML) INJECTION EVERY 12 HOURS SCHEDULED
Status: DISCONTINUED | OUTPATIENT
Start: 2023-09-29 | End: 2023-09-29 | Stop reason: HOSPADM

## 2023-09-29 RX ORDER — SODIUM CHLORIDE 0.9 % (FLUSH) 0.9 %
5-40 SYRINGE (ML) INJECTION PRN
Status: DISCONTINUED | OUTPATIENT
Start: 2023-09-29 | End: 2023-09-29 | Stop reason: HOSPADM

## 2023-09-29 RX ORDER — NITROGLYCERIN 20 MG/100ML
INJECTION INTRAVENOUS PRN
Status: DISCONTINUED | OUTPATIENT
Start: 2023-09-29 | End: 2023-09-29 | Stop reason: HOSPADM

## 2023-09-29 RX ORDER — HEPARIN SODIUM 1000 [USP'U]/ML
INJECTION, SOLUTION INTRAVENOUS; SUBCUTANEOUS PRN
Status: DISCONTINUED | OUTPATIENT
Start: 2023-09-29 | End: 2023-09-29 | Stop reason: HOSPADM

## 2023-09-29 RX ORDER — BIVALIRUDIN 250 MG/5ML
INJECTION, POWDER, LYOPHILIZED, FOR SOLUTION INTRAVENOUS PRN
Status: DISCONTINUED | OUTPATIENT
Start: 2023-09-29 | End: 2023-09-29 | Stop reason: HOSPADM

## 2023-09-29 RX ORDER — MIDAZOLAM HYDROCHLORIDE 1 MG/ML
INJECTION INTRAMUSCULAR; INTRAVENOUS PRN
Status: DISCONTINUED | OUTPATIENT
Start: 2023-09-29 | End: 2023-09-29 | Stop reason: HOSPADM

## 2023-09-29 RX ORDER — VERAPAMIL HYDROCHLORIDE 2.5 MG/ML
INJECTION, SOLUTION INTRAVENOUS PRN
Status: DISCONTINUED | OUTPATIENT
Start: 2023-09-29 | End: 2023-09-29 | Stop reason: HOSPADM

## 2023-09-29 RX ORDER — FENTANYL CITRATE 50 UG/ML
INJECTION, SOLUTION INTRAMUSCULAR; INTRAVENOUS PRN
Status: DISCONTINUED | OUTPATIENT
Start: 2023-09-29 | End: 2023-09-29 | Stop reason: HOSPADM

## 2023-09-29 ASSESSMENT — PAIN - FUNCTIONAL ASSESSMENT: PAIN_FUNCTIONAL_ASSESSMENT: NONE - DENIES PAIN

## 2023-09-29 NOTE — H&P
HISTORY OF PRESENT ILLNESS  Eddie Feliciano.  63 y.o. male           Chief Complaint   Patient presents with    Follow-up       Overdue: chest pressure         ASSESSMENT and PLAN     The primary encounter diagnosis was Atherosclerosis of native coronary artery of native heart with angina pectoris ([address]). Diagnoses of Essential hypertension, Cardiomyopathy, unspecified type ([address]), and Chest pain, unspecified type were also pertinent to this visit. Mr. Karla Conner has history of CAD. Back in 2011, he had Vision stent to the RCA and Xience stent to the LAD at [facility]. [facility] He had new distal 90% RCA stenosis proximal to the bifurcation. This was successfully stented using science MARISA. He could not tolerate beta blockers in the past and previously had refused statins. However, because of significant dyslipidemia, he is now on Crestor 20 mg daily. His echocardiogram in 2018 revealed EF of 40% with mild to moderate mitral regurgitation. His nuclear scan in 2019 revealed intermediate risk with inferior fixed defect as well as inferolateral fixed defect. Calculated EF was 40%. He did not follow-up with cardiology between July 2021 and September 2023. He has trouble with medication noncompliance. He has intolerance to beta-blockers and Coreg. CAD:    Recently came back after greater than 2 years is because he started having his typical anginal symptoms again about a week ago. He has not had any rest pains but has had easily inducible chest pains and neck pains. With his classic recurrent chest pains, I will proceed with coronary angiography and possible revascularization. This was discussed with the patient at length. He understands wishes to proceed. BP:    Well controlled at 122/78. Rhythm:    Stable regular rhythm at 64 bpm.  CHF:    There is no evidence of decompensated CHF noted.   Weight:     His weight

## 2023-09-29 NOTE — PROGRESS NOTES
Cath holding summary:    0715: Patient ambulated from waiting area without difficulty, placed on monitor 6. A&O, no c/o. ID, NPO status, allergies verified. H&P reviewed, med rec completed. PIV x2 inserted, groin prep completed, consent ready for signature. 0745: Verbal report given to John Camarena on [address] [place] [address].  being transferred to [facility] for ordered procedure. Report consisted of patient's Situation, Background, Assessment and Recommendations (SBAR). Information from the following report(s) Adult Overview and Pre Procedure Checklist was reviewed with the receiving nurse. Opportunity for questions and clarification was provided. 0900: Verbal report received from N[address] on [address] [place] [address]. being received from [address] [place] [address] for routine post-op. Report consisted of patient's Situation, Background, Assessment and Recommendations (SBAR). Information from the following report(s) MAR and Event Log was reviewed with the receiving nurse. Opportunity for questions and clarification was provided. Assessment completed upon patient's arrival to unit and care assumed. Procedure: Cardiac Cath  Intervention: Yes  Site: Right, Arm    1030: D-stat band removed from right wrist per protocol. No bleeding or hematoma noted, site covered with hemostatic dressing and tegaderm. Patient denies pain, pulse palpable and brisk cap refill distal to site. Cath site instructions and post care including s/s of bleeding and methods of bleeding prevention reviewed with patient who verbalized understanding. 1200: Pt removed his BP cuff and refused to put it back on.    1315: AVS Discharge instructions reviewed with patient, all questions answered. Patient verbalized understanding, copy given. Procedural site within normal limits, PIV removed. No hematoma or bleeding noted from procedural or IV site. Patient denied complaints, discharged with support person in stable condition. Escorted out to vehicle for transport home.

## 2023-09-29 NOTE — DISCHARGE INSTRUCTIONS
local anesthesia, you may feel some pain or discomfort as it wears off. If you have pain do not be afraid to say so. Pain medication works better if you take it before the pain gets bad. Side effects from sedation shouldn't last long and include:  Drowsiness. Nausea and vomiting. For the next 24 hours:  Do not make any important decisions or do anything that requires attention to detail. Do not drive or operate any machinery that could be dangerous. Do not drink any alcoholic beverages. Rest when you feel tired. Drink plenty of fluids  Eat a normal diet, unless given other instructions. If stomach is upset, try clear liquids and bland, low-fat foods like rice or toast.   If the doctor gave you a prescription for pain medication, take it as prescribed. If you are not on any prescribed pain medications, ask your doctor if you can take an over-the-counter pain medications. General Nurse Instructions: Follow up care is a key part of treatment and safety. Be sure to make and go to all appointments, and call your doctor if you are having any problems. It's also a good idea to know your test results and keep a list of your medicines you take. Call 911 if you experience any of the following symptoms: You passed out  You have severe trouble breathing  You have sudden chest pain and shortness of breath or you cough up blood. You have symptoms of a heart attack:   Chest pain, pressure, or a strange feeling in the chest  Sweating  Shortness of breath  Nausea or vomiting  Pain, pressure or a strange feeling in the back, neck, jaw, or upper belly or in one or both shoulders or arms  Lightheadedness or sudden weakness  A fast or irregular heart rate. You have been diagnosed with angina or coronary artery disease, and you have symptoms that do not go away with rest or are not getting better within 5 minutes of nitroglycerin.    After you call 911, the  may tell you to chew 1 adult strength or 2-4 low dose aspirin. Wait for the ambulance do not drive yourself. Bleeding from the area where the catheter was placed in your artery or vein  You have a fast-growing painful lump at the catheter site  General Instructions for a healthy lifestyle:  No smoking or tobacco products. Avoid any exposure to second hand smoke  Surgeon General's Warning: Quitting smoking now greatly reduces serious risk to your health  Obesity, smoking and sedentary lifestyle greatly increases your risk for illness      The discharge information has been reviewed with the Patient and Family member. The Patient and Family member verbalized understanding. Discharge medications reviewed with the Patient and Family member and appropriate educational materials and side effects teaching were provided.

## 2023-10-02 RX ORDER — CLOPIDOGREL BISULFATE 75 MG/1
75 TABLET ORAL DAILY
Qty: 90 TABLET | Refills: 3 | Status: SHIPPED | OUTPATIENT
Start: 2023-10-02

## 2023-10-02 NOTE — TELEPHONE ENCOUNTER
Patient having sob with Brilinta and he stated it Saturday night . He has only been taking aspirin 325 mg daily .      Verbal order and read back per Mirtha Cain MD  Can switch brilinta to plavix 75 mg daily

## 2023-10-04 RX ORDER — CLOPIDOGREL BISULFATE 75 MG/1
75 TABLET ORAL DAILY
Qty: 30 TABLET | Refills: 6 | Status: SHIPPED | OUTPATIENT
Start: 2023-10-04

## 2023-11-25 NOTE — PROGRESS NOTES
Diogo Millan Jr.presents today for a post-cardiac catheterization follow-up. He was seen by Dr. Birgit Sommer on 9/20/23 for complaints of chest pain. He has been lost to cardiology follow-up since July 2021. He underwent a left heart cath on 9/29/23 which demonstrated:     Left Anterior Descending   Mid LAD lesion, 10% stenosed. The lesion was previously treated using a drug-eluting stent. Previous treatment took place >2 years ago. Right Coronary Artery   Prox RCA lesion, 15% stenosed. The lesion was previously treated using a stent (unknown type). Mid RCA to Dist RCA lesion, 90% stenosed. Diagnostic coronary angiography shows negative for . Lesion is the culprit lesion. The lesion was not previously treated. The stenosis was measured by a visual reading. He then underwent PCI/stenting of the RCA with a 3.5 x 18 Xience Kelly MARISA. EF noted during the cardiac cath was 55-60%. While reviewing his medications, he informed us that he stopped taking the aspirin after he began taking the Plavix. He is a 63year old male with history of CAD (s/p Vision stent to the RCA and Xience stent to the LAD in 2011) and dyslipidemia. He has been unable to tolerate beta blockers in the past and refused statins. However, when last seen, he was on rosuvastatin 20mg daily. His last nuclear stress test was done in Jan. 2019 and it was abnormal.  It showed a fixed defect consistent with prior myocardial infarction. No evidence of transient ischemic dilatation. His last echo was done in Aug. 2018 and it showed an EF of 40%, mild concentric LVH, grade 2 diastolic dysfunction, PASP 37 mmHg. Prior to seeing Dr. Birgit Sommer in Sept. 2023, he was lost to follow-up for about 2 years.     Malvin Rogers is a 63 y.o. male presents today for :  Chief Complaint   Patient presents with    Follow-up     Left heart cath f/u 9/29/23    Edema     Rt ankle edema on/off       PMH:  Past Medical History:   Diagnosis Date

## 2023-11-29 ENCOUNTER — OFFICE VISIT (OUTPATIENT)
Age: 63
End: 2023-11-29
Payer: COMMERCIAL

## 2023-11-29 VITALS
OXYGEN SATURATION: 98 % | BODY MASS INDEX: 27.43 KG/M2 | HEIGHT: 73 IN | DIASTOLIC BLOOD PRESSURE: 86 MMHG | WEIGHT: 207 LBS | HEART RATE: 65 BPM | SYSTOLIC BLOOD PRESSURE: 108 MMHG

## 2023-11-29 DIAGNOSIS — I10 ESSENTIAL HYPERTENSION: ICD-10-CM

## 2023-11-29 DIAGNOSIS — R07.9 CHEST PAIN, UNSPECIFIED TYPE: ICD-10-CM

## 2023-11-29 DIAGNOSIS — I25.119 ATHEROSCLEROSIS OF NATIVE CORONARY ARTERY OF NATIVE HEART WITH ANGINA PECTORIS (HCC): Primary | ICD-10-CM

## 2023-11-29 DIAGNOSIS — E78.5 HYPERLIPIDEMIA, UNSPECIFIED HYPERLIPIDEMIA TYPE: ICD-10-CM

## 2023-11-29 DIAGNOSIS — I42.9 CARDIOMYOPATHY, UNSPECIFIED TYPE (HCC): ICD-10-CM

## 2023-11-29 PROCEDURE — 3079F DIAST BP 80-89 MM HG: CPT | Performed by: NURSE PRACTITIONER

## 2023-11-29 PROCEDURE — 93000 ELECTROCARDIOGRAM COMPLETE: CPT | Performed by: NURSE PRACTITIONER

## 2023-11-29 PROCEDURE — 3074F SYST BP LT 130 MM HG: CPT | Performed by: NURSE PRACTITIONER

## 2023-11-29 PROCEDURE — 99214 OFFICE O/P EST MOD 30 MIN: CPT | Performed by: NURSE PRACTITIONER

## 2023-11-29 RX ORDER — ASPIRIN 81 MG/1
81 TABLET ORAL DAILY
Qty: 1 TABLET | Refills: 0
Start: 2023-11-29

## 2023-11-29 ASSESSMENT — ENCOUNTER SYMPTOMS
ABDOMINAL DISTENTION: 0
NAUSEA: 0
CONSTIPATION: 0
WHEEZING: 0
COUGH: 0
DIARRHEA: 0
BLOOD IN STOOL: 0
SHORTNESS OF BREATH: 0
CHEST TIGHTNESS: 0
VOMITING: 0

## 2023-11-29 ASSESSMENT — PATIENT HEALTH QUESTIONNAIRE - PHQ9
SUM OF ALL RESPONSES TO PHQ QUESTIONS 1-9: 0
SUM OF ALL RESPONSES TO PHQ QUESTIONS 1-9: 0
1. LITTLE INTEREST OR PLEASURE IN DOING THINGS: 0
SUM OF ALL RESPONSES TO PHQ9 QUESTIONS 1 & 2: 0
SUM OF ALL RESPONSES TO PHQ QUESTIONS 1-9: 0
SUM OF ALL RESPONSES TO PHQ QUESTIONS 1-9: 0
2. FEELING DOWN, DEPRESSED OR HOPELESS: 0

## 2023-11-29 NOTE — PROGRESS NOTES
Chalgavin Lipoma. presents today for   Chief Complaint   Patient presents with    Follow-up     Left heart cath f/u 9/29/23    Edema     Rt ankle edema on/off       Eddie Amezquita Jr. preferred language for health care discussion is english/other. Is someone accompanying this pt? no    Is the patient using any DME equipment during OV? no    Depression Screening:  Depression: Not at risk (11/29/2023)    PHQ-2     PHQ-2 Score: 0        Learning Assessment:  Who is the primary learner? Patient    What is the preferred language for health care of the primary learner? ENGLISH    How does the primary learner prefer to learn new concepts? DEMONSTRATION    Answered By patient    Relationship to Learner SELF           Pt currently taking Anticoagulant therapy? no    Pt currently taking Antiplatelet therapy ? Plavix 75 mg 1x daily       Coordination of Care:  1. Have you been to the ER, urgent care clinic since your last visit? Hospitalized since your last visit? yes    2. Have you seen or consulted any other health care providers outside of the [address] [place] [address] since your last visit? Include any pap smears or colon screening.  no

## 2023-11-29 NOTE — PATIENT INSTRUCTIONS
Please restart Aspirin 81mg once a day  Take Plavix 75mg and Aspirin 81mg daily as prescribed without fail  All other medications to remain the same  Follow-up with Dr. Nicci Forbes as scheduled and as needed  Smoking cessation was discussed and highly encouraged

## 2024-05-14 ENCOUNTER — OFFICE VISIT (OUTPATIENT)
Age: 64
End: 2024-05-14
Payer: COMMERCIAL

## 2024-05-14 VITALS
HEIGHT: 73 IN | DIASTOLIC BLOOD PRESSURE: 62 MMHG | SYSTOLIC BLOOD PRESSURE: 116 MMHG | OXYGEN SATURATION: 97 % | HEART RATE: 67 BPM | WEIGHT: 208 LBS | BODY MASS INDEX: 27.57 KG/M2

## 2024-05-14 DIAGNOSIS — R07.9 CHEST PAIN, UNSPECIFIED TYPE: ICD-10-CM

## 2024-05-14 DIAGNOSIS — I10 ESSENTIAL HYPERTENSION: ICD-10-CM

## 2024-05-14 DIAGNOSIS — E78.5 HYPERLIPIDEMIA, UNSPECIFIED HYPERLIPIDEMIA TYPE: ICD-10-CM

## 2024-05-14 DIAGNOSIS — I25.119 ATHEROSCLEROSIS OF NATIVE CORONARY ARTERY OF NATIVE HEART WITH ANGINA PECTORIS (HCC): ICD-10-CM

## 2024-05-14 DIAGNOSIS — I42.9 CARDIOMYOPATHY, UNSPECIFIED TYPE (HCC): Primary | ICD-10-CM

## 2024-05-14 PROCEDURE — 3078F DIAST BP <80 MM HG: CPT | Performed by: INTERNAL MEDICINE

## 2024-05-14 PROCEDURE — 93000 ELECTROCARDIOGRAM COMPLETE: CPT | Performed by: INTERNAL MEDICINE

## 2024-05-14 PROCEDURE — 3074F SYST BP LT 130 MM HG: CPT | Performed by: INTERNAL MEDICINE

## 2024-05-14 PROCEDURE — 99214 OFFICE O/P EST MOD 30 MIN: CPT | Performed by: INTERNAL MEDICINE

## 2024-05-14 ASSESSMENT — ANXIETY QUESTIONNAIRES
3. WORRYING TOO MUCH ABOUT DIFFERENT THINGS: NOT AT ALL
2. NOT BEING ABLE TO STOP OR CONTROL WORRYING: NOT AT ALL
4. TROUBLE RELAXING: NOT AT ALL
IF YOU CHECKED OFF ANY PROBLEMS ON THIS QUESTIONNAIRE, HOW DIFFICULT HAVE THESE PROBLEMS MADE IT FOR YOU TO DO YOUR WORK, TAKE CARE OF THINGS AT HOME, OR GET ALONG WITH OTHER PEOPLE: NOT DIFFICULT AT ALL
6. BECOMING EASILY ANNOYED OR IRRITABLE: NOT AT ALL
5. BEING SO RESTLESS THAT IT IS HARD TO SIT STILL: NOT AT ALL
1. FEELING NERVOUS, ANXIOUS, OR ON EDGE: NOT AT ALL
GAD7 TOTAL SCORE: 0
7. FEELING AFRAID AS IF SOMETHING AWFUL MIGHT HAPPEN: NOT AT ALL

## 2024-05-14 ASSESSMENT — PATIENT HEALTH QUESTIONNAIRE - PHQ9
SUM OF ALL RESPONSES TO PHQ QUESTIONS 1-9: 0
1. LITTLE INTEREST OR PLEASURE IN DOING THINGS: NOT AT ALL
SUM OF ALL RESPONSES TO PHQ9 QUESTIONS 1 & 2: 0
SUM OF ALL RESPONSES TO PHQ QUESTIONS 1-9: 0
2. FEELING DOWN, DEPRESSED OR HOPELESS: NOT AT ALL
SUM OF ALL RESPONSES TO PHQ QUESTIONS 1-9: 0
SUM OF ALL RESPONSES TO PHQ QUESTIONS 1-9: 0

## 2024-05-14 NOTE — PROGRESS NOTES
HISTORY OF PRESENT ILLNESS  Eddie Amezquita Jr.  63 y.o. male     Chief Complaint   Patient presents with    Follow-up     6 month       ASSESSMENT and PLAN    The primary encounter diagnosis was Cardiomyopathy, unspecified type (HCC). Diagnoses of Atherosclerosis of native coronary artery of native heart with angina pectoris (HCC), Essential hypertension, Chest pain, unspecified type, and Hyperlipidemia, unspecified hyperlipidemia type were also pertinent to this visit.    Mr. Eddie Amezquita has history of CAD.  Back in 2011, he had Vision stent to the RCA and Xience stent to the LAD at [facility].  He presented with unstable angina to [facility] in June 2016.  He had new distal 90% RCA stenosis proximal to the bifurcation.  This was successfully stented using science MARISA.  He could not tolerate beta blockers in the past and previously had refused statins.  However, because of significant dyslipidemia, he is now on Crestor 20 mg daily.   His echocardiogram in 2018 revealed EF of 40% with mild to moderate mitral regurgitation.  His nuclear scan in 2019 revealed intermediate risk with inferior fixed defect as well as inferolateral fixed defect.  Calculated EF was 40%.  He did not follow-up with cardiology between July 2021 and September 2023.  He has trouble with medication noncompliance.  He has intolerance to beta-blockers and Coreg.  Because of recurrent chest pains, he underwent repeat coronary angiography in September 2023.  This revealed dominant RCA with long 90% hazy distal stenosis.  His left coronary system is patent with EF 55-60%.  Distal RCA hazy lesion was stented to 0% using 3.5 mm MARISA 18 mm length overlapped with 12 mm length.  Subsequently, his symptoms resolved completely.    CAD:    Clinically stable.  BP:    Well-controlled at 116/62.  Rhythm:    Regular sinus rhythm at 67 bpm.  CHF:    There is no evidence of decompensated CHF noted.  Weight:     His weight today is 208

## 2024-05-14 NOTE — PROGRESS NOTES
Eddie Amezquita Jr. presents today for   Chief Complaint   Patient presents with    Follow-up     6 month       Eddie Amezquita Jr. preferred language for health care discussion is english/other.    Is someone accompanying this pt? no    Is the patient using any DME equipment during OV? no    Depression Screening:  Depression: Not at risk (5/14/2024)    PHQ-2     PHQ-2 Score: 0        Learning Assessment:  Who is the primary learner? Patient    What is the preferred language for health care of the primary learner? ENGLISH    How does the primary learner prefer to learn new concepts? DEMONSTRATION    Answered By patient    Relationship to Learner SELF           Pt currently taking Anticoagulant therapy? no    Pt currently taking Antiplatelet therapy ? Aspirin 81mg daily.plavix 75mg daily      Coordination of Care:  1. Have you been to the ER, urgent care clinic since your last visit? Hospitalized since your last visit? no    2. Have you seen or consulted any other health care providers outside of the [facility] since your last visit? Include any pap smears or colon screening. no

## 2025-01-09 RX ORDER — CLOPIDOGREL BISULFATE 75 MG/1
75 TABLET ORAL DAILY
Qty: 90 TABLET | Refills: 3 | Status: SHIPPED | OUTPATIENT
Start: 2025-01-09

## 2025-03-04 ENCOUNTER — OFFICE VISIT (OUTPATIENT)
Age: 65
End: 2025-03-04
Payer: COMMERCIAL

## 2025-03-04 VITALS
WEIGHT: 217 LBS | SYSTOLIC BLOOD PRESSURE: 118 MMHG | OXYGEN SATURATION: 97 % | BODY MASS INDEX: 28.76 KG/M2 | HEART RATE: 69 BPM | DIASTOLIC BLOOD PRESSURE: 80 MMHG | HEIGHT: 73 IN

## 2025-03-04 DIAGNOSIS — E78.5 HYPERLIPIDEMIA, UNSPECIFIED HYPERLIPIDEMIA TYPE: ICD-10-CM

## 2025-03-04 DIAGNOSIS — I42.9 CARDIOMYOPATHY, UNSPECIFIED TYPE (HCC): ICD-10-CM

## 2025-03-04 DIAGNOSIS — Z13.220 SCREENING CHOLESTEROL LEVEL: ICD-10-CM

## 2025-03-04 DIAGNOSIS — R07.9 CHEST PAIN, UNSPECIFIED TYPE: ICD-10-CM

## 2025-03-04 DIAGNOSIS — I10 ESSENTIAL HYPERTENSION: ICD-10-CM

## 2025-03-04 DIAGNOSIS — I25.119 ATHEROSCLEROSIS OF NATIVE CORONARY ARTERY OF NATIVE HEART WITH ANGINA PECTORIS: ICD-10-CM

## 2025-03-04 DIAGNOSIS — Z13.220 SCREENING CHOLESTEROL LEVEL: Primary | ICD-10-CM

## 2025-03-04 PROCEDURE — 3074F SYST BP LT 130 MM HG: CPT | Performed by: INTERNAL MEDICINE

## 2025-03-04 PROCEDURE — 93000 ELECTROCARDIOGRAM COMPLETE: CPT | Performed by: INTERNAL MEDICINE

## 2025-03-04 PROCEDURE — 99214 OFFICE O/P EST MOD 30 MIN: CPT | Performed by: INTERNAL MEDICINE

## 2025-03-04 PROCEDURE — 3079F DIAST BP 80-89 MM HG: CPT | Performed by: INTERNAL MEDICINE

## 2025-03-04 ASSESSMENT — PATIENT HEALTH QUESTIONNAIRE - PHQ9
2. FEELING DOWN, DEPRESSED OR HOPELESS: NOT AT ALL
SUM OF ALL RESPONSES TO PHQ QUESTIONS 1-9: 0
1. LITTLE INTEREST OR PLEASURE IN DOING THINGS: NOT AT ALL
SUM OF ALL RESPONSES TO PHQ QUESTIONS 1-9: 0

## 2025-03-04 NOTE — PROGRESS NOTES
Eddie Amezquita Jr. presents today for   Chief Complaint   Patient presents with    Follow-up       Eddie Amezquita Jr. preferred language for health care discussion is english/other.    Is someone accompanying this pt? no    Is the patient using any DME equipment during OV? no    Depression Screening:  Depression: Not at risk (3/4/2025)    PHQ-2     PHQ-2 Score: 0        Learning Assessment:  Who is the primary learner? Patient    What is the preferred language for health care of the primary learner? ENGLISH    How does the primary learner prefer to learn new concepts? DEMONSTRATION    Answered By patient    Relationship to Learner SELF           Pt currently taking Anticoagulant therapy? no    Pt currently taking Antiplatelet therapy ? Aspirin  plavix      Coordination of Care:  1. Have you been to the ER, urgent care clinic since your last visit? Hospitalized since your last visit? no    2. Have you seen or consulted any other health care providers outside of the [facility] since your last visit? Include any pap smears or colon screening. no    
rhythm, Q waves in leads III and aVF, unchanged from previous tracings.     Jim Villasenor MD   3/4/2025

## 2025-06-11 RX ORDER — ROSUVASTATIN CALCIUM 20 MG/1
TABLET, COATED ORAL
Qty: 90 TABLET | Refills: 3 | Status: SHIPPED | OUTPATIENT
Start: 2025-06-11